# Patient Record
Sex: FEMALE | Race: BLACK OR AFRICAN AMERICAN | NOT HISPANIC OR LATINO | Employment: UNEMPLOYED | RURAL
[De-identification: names, ages, dates, MRNs, and addresses within clinical notes are randomized per-mention and may not be internally consistent; named-entity substitution may affect disease eponyms.]

---
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
## 2024-05-23 ENCOUNTER — HOSPITAL ENCOUNTER (EMERGENCY)
Facility: HOSPITAL | Age: 19
Discharge: HOME | End: 2024-05-23

## 2024-05-23 ENCOUNTER — TELEPHONE (OUTPATIENT)
Dept: EMERGENCY MEDICINE | Facility: HOSPITAL | Age: 19
End: 2024-05-23

## 2024-05-23 VITALS
SYSTOLIC BLOOD PRESSURE: 104 MMHG | HEIGHT: 60 IN | RESPIRATION RATE: 18 BRPM | WEIGHT: 155 LBS | BODY MASS INDEX: 30.43 KG/M2 | DIASTOLIC BLOOD PRESSURE: 68 MMHG | HEART RATE: 81 BPM | OXYGEN SATURATION: 98 % | TEMPERATURE: 99 F

## 2024-05-23 DIAGNOSIS — H10.31 ACUTE BACTERIAL CONJUNCTIVITIS OF RIGHT EYE: Primary | ICD-10-CM

## 2024-05-23 PROCEDURE — 99283 EMERGENCY DEPT VISIT LOW MDM: CPT

## 2024-05-23 PROCEDURE — 25000003 PHARM REV CODE 250: Performed by: NURSE PRACTITIONER

## 2024-05-23 PROCEDURE — 25000242 PHARM REV CODE 250 ALT 637 W/ HCPCS: Performed by: NURSE PRACTITIONER

## 2024-05-23 RX ORDER — ERYTHROMYCIN 5 MG/G
OINTMENT OPHTHALMIC
Status: COMPLETED | OUTPATIENT
Start: 2024-05-23 | End: 2024-05-23

## 2024-05-23 RX ORDER — TETRACAINE HYDROCHLORIDE 5 MG/ML
2 SOLUTION OPHTHALMIC
Status: COMPLETED | OUTPATIENT
Start: 2024-05-23 | End: 2024-05-23

## 2024-05-23 RX ORDER — ERYTHROMYCIN 5 MG/G
OINTMENT OPHTHALMIC
Qty: 3.5 G | Refills: 0 | Status: SHIPPED | OUTPATIENT
Start: 2024-05-23 | End: 2024-05-28 | Stop reason: ALTCHOICE

## 2024-05-23 RX ADMIN — FLUORESCEIN SODIUM 1 EACH: 0.6 STRIP OPHTHALMIC at 03:05

## 2024-05-23 RX ADMIN — ERYTHROMYCIN: 5 OINTMENT OPHTHALMIC at 04:05

## 2024-05-23 RX ADMIN — TETRACAINE HYDROCHLORIDE 2 DROP: 5 SOLUTION OPHTHALMIC at 03:05

## 2024-05-23 NOTE — Clinical Note
"Arnaud "Arnaud" Juvencio was seen and treated in our emergency department on 5/23/2024.  She may return to work on 05/25/2024.       If you have any questions or concerns, please don't hesitate to call.      Amara Merino, FNP"

## 2024-05-23 NOTE — ED PROVIDER NOTES
Encounter Date: 5/23/2024       History     Chief Complaint   Patient presents with    Eye Pain     Patient presents to ER with complaint of right eye pain.  Patient reports symptoms started 4 days ago. She denies injury or accident.  She reports itching and redness.  She denies changes in vision.  Patient denies use of contacts or false eyelashes.      The history is provided by the patient. No  was used.     Review of patient's allergies indicates:  No Known Allergies  Past Medical History:   Diagnosis Date    Exposure to sexually transmitted disease (STD) 07/20/2022    GC, CT, and trich     No past surgical history on file.  No family history on file.  Social History     Tobacco Use    Smoking status: Every Day     Types: Vaping with nicotine    Smokeless tobacco: Never   Substance Use Topics    Alcohol use: Not Currently    Drug use: Yes     Types: Marijuana     Review of Systems   Constitutional:  Positive for activity change and fatigue.   Eyes:  Positive for pain, discharge and redness.   All other systems reviewed and are negative.      Physical Exam     Initial Vitals [05/23/24 1517]   BP Pulse Resp Temp SpO2   104/68 81 18 98.6 °F (37 °C) 98 %      MAP       --         Physical Exam    Nursing note and vitals reviewed.  Constitutional: She appears well-developed and well-nourished.   HENT:   Head: Normocephalic.   Nose: Nose normal.   Mouth/Throat: Oropharynx is clear and moist.   Eyes: Conjunctivae and EOM are normal.   Neck: Neck supple.   Normal range of motion.  Cardiovascular:  Normal rate, normal heart sounds and intact distal pulses.           Pulmonary/Chest: Breath sounds normal.   Abdominal: Abdomen is soft. Bowel sounds are normal.   Musculoskeletal:         General: Normal range of motion.      Cervical back: Normal range of motion and neck supple.     Neurological: She is alert and oriented to person, place, and time. She has normal strength. GCS score is 15. GCS eye  subscore is 4. GCS verbal subscore is 5. GCS motor subscore is 6.   Skin: Skin is warm and dry. Capillary refill takes less than 2 seconds.   Psychiatric: She has a normal mood and affect. Thought content normal.         Medical Screening Exam   See Full Note    ED Course   Procedures  Labs Reviewed - No data to display       Imaging Results    None          Medications   TETRAcaine HCl (PF) 0.5 % Drop 2 drop (2 drops Right Eye Given 24 1539)   fluorescein ophthalmic strip 1 each (1 each Left Eye Given 24 1539)   erythromycin 5 mg/gram (0.5 %) ophthalmic ointment ( Right Eye Given 24 1627)     Medical Decision Making  Patient presents to ER with complaint of right eye pain.  Patient reports symptoms started 4 days ago. She denies injury or accident.  She reports itching and redness.  She denies changes in vision.  Patient denies use of contacts or false eyelashes.        Amount and/or Complexity of Data Reviewed  Discussion of management or test interpretation with external provider(s): Tetracaine and fluorescein used to evaluate right eye with woods lamp. Tolerated well by patient.     Erythromycin ointment to right eye wit soft patch.    Patient is dx home with dx of conjunctivitis.  She is instructed to wear patch x 24 hours and to use ointment 2 x daily x 5 days.  She is instructed to fu with PCP in 2 days for recheck if symptoms do not improve.     Risk  Prescription drug management.                                      Clinical Impression:   Final diagnoses:  [H10.31] Acute bacterial conjunctivitis of right eye (Primary)        ED Disposition Condition    Discharge Stable          ED Prescriptions       Medication Sig Dispense Start Date End Date Auth. Provider    erythromycin (ROMYCIN) ophthalmic ointment () Place a 1/2 inch ribbon of ointment into the right lower eyelid bid x 5 days 3.5 g 2024 Amara Merino, CATP          Follow-up Information    None          Wilber  Amara PLATT, [facility]  06/03/24 6860

## 2024-05-23 NOTE — DISCHARGE INSTRUCTIONS
Wear patch for 24 hours then remove.  Apply erythromycin ointment twice daily to right lower eyelid for 5 days.  Follow up with primary care provider in 2 days if symptoms do not improve with treatment.  Return to the ER with new or worsening symptoms.

## 2024-05-28 ENCOUNTER — HOSPITAL ENCOUNTER (EMERGENCY)
Facility: HOSPITAL | Age: 19
Discharge: HOME | End: 2024-05-28

## 2024-05-28 VITALS
RESPIRATION RATE: 20 BRPM | HEART RATE: 70 BPM | SYSTOLIC BLOOD PRESSURE: 107 MMHG | OXYGEN SATURATION: 99 % | DIASTOLIC BLOOD PRESSURE: 54 MMHG | HEIGHT: 60 IN | TEMPERATURE: 99 F | BODY MASS INDEX: 29.94 KG/M2 | WEIGHT: 152.5 LBS

## 2024-05-28 DIAGNOSIS — B96.89 BACTERIAL CONJUNCTIVITIS OF BOTH EYES: Primary | ICD-10-CM

## 2024-05-28 DIAGNOSIS — H10.9 BACTERIAL CONJUNCTIVITIS OF BOTH EYES: Primary | ICD-10-CM

## 2024-05-28 PROCEDURE — 25000003 PHARM REV CODE 250

## 2024-05-28 PROCEDURE — 99283 EMERGENCY DEPT VISIT LOW MDM: CPT

## 2024-05-28 RX ORDER — CIPROFLOXACIN HYDROCHLORIDE 3 MG/ML
2 SOLUTION/ DROPS OPHTHALMIC 4 TIMES DAILY
Qty: 10 ML | Refills: 0 | Status: SHIPPED | OUTPATIENT
Start: 2024-05-28 | End: 2024-06-04

## 2024-05-28 RX ORDER — CIPROFLOXACIN HYDROCHLORIDE 3 MG/ML
2 SOLUTION/ DROPS OPHTHALMIC 4 TIMES DAILY
Status: DISCONTINUED | OUTPATIENT
Start: 2024-05-29 | End: 2024-05-28

## 2024-05-28 RX ORDER — CIPROFLOXACIN HYDROCHLORIDE 3 MG/ML
2 SOLUTION/ DROPS OPHTHALMIC 4 TIMES DAILY
Status: DISCONTINUED | OUTPATIENT
Start: 2024-05-28 | End: 2024-05-29 | Stop reason: HOSPADM

## 2024-05-28 RX ADMIN — CIPROFLOXACIN HYDROCHLORIDE 2 DROP: 3 SOLUTION/ DROPS OPHTHALMIC at 10:05

## 2024-05-29 NOTE — DISCHARGE INSTRUCTIONS
Discontinue erythromycin  Start Cipro Eye drops four times daily, if no improvement patient will need to see ophthalmology.   If improvement is noted, continue drops for duration of 7 days

## 2024-05-29 NOTE — ED PROVIDER NOTES
"Encounter Date: 5/28/2024       History     Chief Complaint   Patient presents with    Conjunctivitis     C/o both eyes pink and burning, hurt and watering.  States when she wipes the R eye it has blood on the tissue.  States has been rubbing R eye a lot so unsure if she did anything to the eye     Pt is a 19 yo female who presents with cc of "bilateral eye pain and discharge." Pt states that she was seen approximately 1 week ago with conjunctivitis of 1 eye, and given erythromycin ointment at that time. Since then, she states that it has gotten worse and spread. This is thought to be 2/2 improper application of abx. Will treat with cipro, pt instructed that should symptoms worsen or not start getting better within 2 days, she should see eye doc.        Review of patient's allergies indicates:  No Known Allergies  Past Medical History:   Diagnosis Date    Exposure to sexually transmitted disease (STD) 07/20/2022    GC, CT, and trich     History reviewed. No pertinent surgical history.  No family history on file.  Social History     Tobacco Use    Smoking status: Every Day     Types: Vaping with nicotine    Smokeless tobacco: Never   Substance Use Topics    Alcohol use: Not Currently    Drug use: Yes     Types: Marijuana     Review of Systems   Constitutional:  Negative for chills, diaphoresis, fatigue and fever.   HENT:  Negative for congestion, rhinorrhea, sinus pressure, sinus pain and sore throat.    Eyes:  Positive for pain, discharge and redness.   Respiratory:  Negative for cough, chest tightness, shortness of breath and wheezing.    Cardiovascular:  Negative for chest pain, palpitations and leg swelling.   Gastrointestinal:  Negative for abdominal distention, abdominal pain, constipation, diarrhea, nausea and vomiting.   Musculoskeletal:  Negative for arthralgias and myalgias.   Skin:  Negative for rash and wound.   Neurological:  Negative for dizziness, syncope, weakness, light-headedness and headaches. "   All other systems reviewed and are negative.      Physical Exam     Initial Vitals [05/28/24 2033]   BP Pulse Resp Temp SpO2   (!) 107/54 70 20 98.5 °F (36.9 °C) 99 %      MAP       --         Physical Exam    Nursing note and vitals reviewed.  Constitutional: She appears well-developed and well-nourished. No distress.   HENT:   Head: Normocephalic and atraumatic.   Right Ear: External ear normal.   Left Ear: External ear normal.   Nose: Nose normal.   Eyes: EOM are normal. Pupils are equal, round, and reactive to light. Right eye exhibits discharge. Left eye exhibits discharge.   Neck:   Normal range of motion.  Cardiovascular:  Normal rate, regular rhythm and normal heart sounds.     Exam reveals no gallop and no friction rub.       No murmur heard.  Pulmonary/Chest: Breath sounds normal. No respiratory distress. She has no wheezes. She has no rhonchi. She has no rales.   Abdominal: Abdomen is soft. Bowel sounds are normal. She exhibits no distension. There is no abdominal tenderness. There is no rebound and no guarding.   Musculoskeletal:         General: Normal range of motion.      Cervical back: Normal range of motion.     Neurological: She is alert and oriented to person, place, and time.   Skin: Skin is warm and dry.   Psychiatric: She has a normal mood and affect. Thought content normal.         Medical Screening Exam   See Full Note    ED Course   Procedures  Labs Reviewed - No data to display       Imaging Results    None          Medications - No data to display  Medical Decision Making  MDM    Patient presents for emergent evaluation of acute eye redness.  In the ED patient found to have acute bacterial conjunctivitis.        Discharge MDM  I discussed the patient presentation and findings with the consultant for Dr. Pressley (ED).    Patient was managed be managed with Cipro opt x7 days. Pt instructed to take 4 times daily. Pt instructed that should symptoms worsen or fail to improve within 2 days,  she should be seen by ophtho    Patient was discharged in stable condition.  Detailed return precautions discussed.      Risk  Prescription drug management.              Attending Attestation:   Physician Attestation Statement for Resident:  As the supervising MD   I agree with the above history.  -: 18-year-old female patient presented to the emergency department with bilateral pain discharge   As the supervising MD I agree with the above PE.   -: Physical examination revealed bilateral conjunctivitis.   As the supervising MD I agree with the above treatment, course, plan, and disposition.   -: Patient is treated with ciprofloxacin drops and she needs to follow up with the primary care provide                                          Clinical Impression:   Final diagnoses:  [H10.9, B96.89] Bacterial conjunctivitis of both eyes (Primary)        ED Disposition Condition    Discharge Stable          ED Prescriptions       Medication Sig Dispense Start Date End Date Auth. Provider    ciprofloxacin HCl (CILOXAN) 0.3 % ophthalmic solution () Place 2 drops into both eyes 4 (four) times daily. for 7 days 10 mL 2024 John Noguera MD          Follow-up Information    None          Juan Pressley MD  24

## 2024-07-02 ENCOUNTER — HOSPITAL ENCOUNTER (EMERGENCY)
Facility: HOSPITAL | Age: 19
Discharge: HOME | End: 2024-07-02

## 2024-07-02 VITALS
HEIGHT: 60 IN | SYSTOLIC BLOOD PRESSURE: 108 MMHG | OXYGEN SATURATION: 99 % | TEMPERATURE: 98 F | DIASTOLIC BLOOD PRESSURE: 71 MMHG | RESPIRATION RATE: 18 BRPM | HEART RATE: 82 BPM | WEIGHT: 153 LBS | BODY MASS INDEX: 30.04 KG/M2

## 2024-07-02 DIAGNOSIS — N93.9 ABNORMAL VAGINAL BLEEDING: ICD-10-CM

## 2024-07-02 DIAGNOSIS — N30.00 ACUTE CYSTITIS WITHOUT HEMATURIA: Primary | ICD-10-CM

## 2024-07-02 LAB
ALBUMIN SERPL BCP-MCNC: 3.9 G/DL (ref 3.5–5)
ALBUMIN/GLOB SERPL: 0.9 {RATIO}
ALP SERPL-CCNC: 65 U/L (ref 52–144)
ALT SERPL W P-5'-P-CCNC: 17 U/L (ref 13–56)
ANION GAP SERPL CALCULATED.3IONS-SCNC: 9 MMOL/L (ref 7–16)
AST SERPL W P-5'-P-CCNC: 14 U/L (ref 15–37)
B-HCG UR QL: NEGATIVE
BACTERIA #/AREA URNS HPF: ABNORMAL /HPF
BASOPHILS # BLD AUTO: 0.03 K/UL (ref 0–0.2)
BASOPHILS NFR BLD AUTO: 0.4 % (ref 0–1)
BILIRUB SERPL-MCNC: 0.2 MG/DL (ref ?–1.2)
BILIRUB UR QL STRIP: NEGATIVE
BUN SERPL-MCNC: 9 MG/DL (ref 7–18)
BUN/CREAT SERPL: 11 (ref 6–20)
CALCIUM SERPL-MCNC: 9.3 MG/DL (ref 8.5–10.1)
CHLORIDE SERPL-SCNC: 109 MMOL/L (ref 98–107)
CLARITY UR: CLEAR
CO2 SERPL-SCNC: 25 MMOL/L (ref 21–32)
COLOR UR: ABNORMAL
CREAT SERPL-MCNC: 0.79 MG/DL (ref 0.55–1.02)
CTP QC/QA: YES
DIFFERENTIAL METHOD BLD: ABNORMAL
EGFR (NO RACE VARIABLE) (RUSH/TITUS): 111 ML/MIN/1.73M2
EOSINOPHIL # BLD AUTO: 0.22 K/UL (ref 0–0.5)
EOSINOPHIL NFR BLD AUTO: 2.8 % (ref 1–4)
ERYTHROCYTE [DISTWIDTH] IN BLOOD BY AUTOMATED COUNT: 12.3 % (ref 11.5–14.5)
GLOBULIN SER-MCNC: 4.3 G/DL (ref 2–4)
GLUCOSE SERPL-MCNC: 88 MG/DL (ref 74–106)
GLUCOSE UR STRIP-MCNC: NORMAL MG/DL
HCT VFR BLD AUTO: 42.1 % (ref 38–47)
HGB BLD-MCNC: 13.6 G/DL (ref 12–16)
IMM GRANULOCYTES # BLD AUTO: 0.02 K/UL (ref 0–0.04)
IMM GRANULOCYTES NFR BLD: 0.3 % (ref 0–0.4)
KETONES UR STRIP-SCNC: NEGATIVE MG/DL
LEUKOCYTE ESTERASE UR QL STRIP: ABNORMAL
LYMPHOCYTES # BLD AUTO: 3.4 K/UL (ref 1–4.8)
LYMPHOCYTES NFR BLD AUTO: 43.2 % (ref 27–41)
MCH RBC QN AUTO: 28.2 PG (ref 27–31)
MCHC RBC AUTO-ENTMCNC: 32.3 G/DL (ref 32–36)
MCV RBC AUTO: 87.2 FL (ref 80–96)
MONOCYTES # BLD AUTO: 0.51 K/UL (ref 0–0.8)
MONOCYTES NFR BLD AUTO: 6.5 % (ref 2–6)
MPC BLD CALC-MCNC: 9.5 FL (ref 9.4–12.4)
MUCOUS, UA: ABNORMAL /LPF
NEUTROPHILS # BLD AUTO: 3.69 K/UL (ref 1.8–7.7)
NEUTROPHILS NFR BLD AUTO: 46.8 % (ref 53–65)
NITRITE UR QL STRIP: NEGATIVE
NRBC # BLD AUTO: 0 X10E3/UL
NRBC, AUTO (.00): 0 %
PH UR STRIP: 7 PH UNITS
PLATELET # BLD AUTO: 364 K/UL (ref 150–400)
POTASSIUM SERPL-SCNC: 3.7 MMOL/L (ref 3.5–5.1)
PROT SERPL-MCNC: 8.2 G/DL (ref 6.4–8.2)
PROT UR QL STRIP: NEGATIVE
RBC # BLD AUTO: 4.83 M/UL (ref 4.2–5.4)
RBC # UR STRIP: ABNORMAL /UL
RBC #/AREA URNS HPF: 16 /HPF
SODIUM SERPL-SCNC: 139 MMOL/L (ref 136–145)
SP GR UR STRIP: 1.02
SQUAMOUS #/AREA URNS LPF: ABNORMAL /HPF
UROBILINOGEN UR STRIP-ACNC: NORMAL MG/DL
WBC # BLD AUTO: 7.87 K/UL (ref 4.5–11)
WBC #/AREA URNS HPF: 42 /HPF

## 2024-07-02 PROCEDURE — 85025 COMPLETE CBC W/AUTO DIFF WBC: CPT | Performed by: NURSE PRACTITIONER

## 2024-07-02 PROCEDURE — 80053 COMPREHEN METABOLIC PANEL: CPT | Performed by: NURSE PRACTITIONER

## 2024-07-02 PROCEDURE — 36415 COLL VENOUS BLD VENIPUNCTURE: CPT | Performed by: NURSE PRACTITIONER

## 2024-07-02 PROCEDURE — 81003 URINALYSIS AUTO W/O SCOPE: CPT | Performed by: NURSE PRACTITIONER

## 2024-07-02 PROCEDURE — 81001 URINALYSIS AUTO W/SCOPE: CPT | Performed by: NURSE PRACTITIONER

## 2024-07-02 PROCEDURE — 87086 URINE CULTURE/COLONY COUNT: CPT | Performed by: NURSE PRACTITIONER

## 2024-07-02 PROCEDURE — 81025 URINE PREGNANCY TEST: CPT | Performed by: NURSE PRACTITIONER

## 2024-07-02 PROCEDURE — 87077 CULTURE AEROBIC IDENTIFY: CPT | Performed by: NURSE PRACTITIONER

## 2024-07-02 PROCEDURE — 99283 EMERGENCY DEPT VISIT LOW MDM: CPT

## 2024-07-02 RX ORDER — CEPHALEXIN 500 MG/1
500 CAPSULE ORAL EVERY 8 HOURS
Qty: 15 CAPSULE | Refills: 0 | Status: SHIPPED | OUTPATIENT
Start: 2024-07-02 | End: 2024-07-07

## 2024-07-02 NOTE — DISCHARGE INSTRUCTIONS
Take antibiotics as prescribed.    Drink plenty of fluids.    Follow-up with your primary care provider or an OBGYN in a week if you are no better.    Return to the emergency department for new or worsening symptoms.

## 2024-07-02 NOTE — ED PROVIDER NOTES
Encounter Date: 7/2/2024       History     Chief Complaint   Patient presents with    Vaginal Bleeding     Pt presents to the ed via POV w/ c/o vaginal bleeding since last night and bladder for the past couple of days     HPI  Patient is a 18-year-old  female who presents POV to the emergency department with complaint of vaginal bleeding described as spotting since last night.  She states she is not pregnant of which she is aware.  Her last menstrual period was June 14th through June 18th, and she states it was normal.  She does endorse some mild, generalized abdominal cramping, back pain, nausea.  She denies any prior pregnancies.  She also reports urinary urgency with decreased bladder control.  She states when she has the urge to urinate if she has to wait more than 5 minutes she will urinate on herself.    Review of patient's allergies indicates:  No Known Allergies  Past Medical History:   Diagnosis Date    Exposure to sexually transmitted disease (STD) 07/20/2022    GC, CT, and trich     No past surgical history on file.  No family history on file.  Social History     Tobacco Use    Smoking status: Every Day     Types: Vaping with nicotine    Smokeless tobacco: Never   Substance Use Topics    Alcohol use: Not Currently    Drug use: Yes     Types: Marijuana     Review of Systems   Constitutional:  Negative for chills, diaphoresis and fatigue.   Gastrointestinal:  Positive for abdominal pain and nausea. Negative for anal bleeding, blood in stool, constipation, diarrhea, rectal pain and vomiting.   Genitourinary:  Positive for urgency and vaginal bleeding. Negative for decreased urine volume, difficulty urinating, dysuria, enuresis, flank pain, frequency, hematuria, vaginal discharge and vaginal pain.   Musculoskeletal:  Positive for back pain.       Physical Exam     Initial Vitals [07/02/24 1042]   BP Pulse Resp Temp SpO2   108/71 82 18 97.7 °F (36.5 °C) 99 %      MAP       --         Physical  Exam    Constitutional: She appears well-developed and well-nourished.   HENT:   Head: Normocephalic.   Eyes: Pupils are equal, round, and reactive to light.   Pulmonary/Chest: Breath sounds normal. No respiratory distress.   Abdominal: Abdomen is soft. She exhibits no distension. There is no abdominal tenderness. There is no rebound and no guarding.     Neurological: She is alert and oriented to person, place, and time. GCS score is 15. GCS eye subscore is 4. GCS verbal subscore is 5. GCS motor subscore is 6.   Skin: Skin is warm and dry.   Psychiatric: She has a normal mood and affect. Her behavior is normal. Judgment and thought content normal.         Medical Screening Exam   See Full Note    ED Course   Procedures  Labs Reviewed   URINALYSIS, REFLEX TO URINE CULTURE - Abnormal; Notable for the following components:       Result Value    Leukocytes, UA Small (*)     Blood, UA Small (*)     All other components within normal limits   COMPREHENSIVE METABOLIC PANEL - Abnormal; Notable for the following components:    Chloride 109 (*)     Globulin 4.3 (*)     AST 14 (*)     All other components within normal limits   CBC WITH DIFFERENTIAL - Abnormal; Notable for the following components:    Neutrophils % 46.8 (*)     Lymphocytes % 43.2 (*)     Monocytes % 6.5 (*)     All other components within normal limits   URINALYSIS, MICROSCOPIC - Abnormal; Notable for the following components:    WBC, UA 42 (*)     RBC, UA 16 (*)     Bacteria, UA Occasional (*)     Squamous Epithelial Cells, UA Occasional (*)     Mucous Occasional (*)     All other components within normal limits   CULTURE, URINE   CBC W/ AUTO DIFFERENTIAL    Narrative:     The following orders were created for panel order CBC auto differential.  Procedure                               Abnormality         Status                     ---------                               -----------         ------                     CBC with Differential[[id number]]        Abnormal            Final result                 Please view results for these tests on the individual orders.   POCT URINE PREGNANCY          Imaging Results    None          Medications - No data to display  Medical Decision Making  Amount and/or Complexity of Data Reviewed  Labs: ordered.    MDM: 18-year-old female with vaginal bleeding described as spotting, unknown pregnancy status, we will get labs and urine and reassess.                       1240- UPT is negative.  Labs unremarkable.  We will treat for UTI based on UA results plus patient's symptoms.  Offered ultrasound today for her lower abdominal pain and spotting  to rule out ovarian cyst, etc. but patient states she does not want to wait any longer.  Instructed patient to follow-up in clinic within 1 week if no better on the antibiotics.  Patient instructed her pregnancy test could be a false negative due to her last menstrual period just being 2 weeks ago.  Instructed to take another home test if her period is late this month.  Patient voices understanding and is in agreement with the plan of discharge and follow-up.      Clinical Impression:   Final diagnoses:  [N30.00] Acute cystitis without hematuria (Primary)  [N93.9] Abnormal vaginal bleeding        ED Disposition Condition    Discharge Stable          ED Prescriptions       Medication Sig Dispense Start Date End Date Auth. Provider    cephALEXin (KEFLEX) 500 MG capsule Take 1 capsule (500 mg total) by mouth every 8 (eight) hours. for 5 days 15 capsule 7/2/2024 7/7/2024 Christa Quiñones FNP          Follow-up Information    None          Christa Quiñones FNP  07/02/24 3691

## 2024-07-02 NOTE — Clinical Note
"Arnaud Obregon" Juvencio was seen and treated in our emergency department on 7/2/2024.  She may return to work on 07/03/2024.       If you have any questions or concerns, please don't hesitate to call.      Beronica ALVARENGA    "

## 2024-07-02 NOTE — ED TRIAGE NOTES
Chief Complaint   Patient presents with    Vaginal Bleeding     Pt presents to the ed via POV w/ c/o vaginal bleeding since last night and bladder for the past couple of days      
30-Mar-2019 21:18

## 2024-07-04 LAB — UA COMPLETE W REFLEX CULTURE PNL UR: ABNORMAL

## 2024-08-09 ENCOUNTER — HOSPITAL ENCOUNTER (EMERGENCY)
Facility: HOSPITAL | Age: 19
Discharge: HOME | End: 2024-08-09
Payer: COMMERCIAL

## 2024-08-09 VITALS
RESPIRATION RATE: 17 BRPM | WEIGHT: 154 LBS | HEART RATE: 92 BPM | OXYGEN SATURATION: 100 % | BODY MASS INDEX: 30.23 KG/M2 | HEIGHT: 60 IN | TEMPERATURE: 98 F | SYSTOLIC BLOOD PRESSURE: 103 MMHG | DIASTOLIC BLOOD PRESSURE: 65 MMHG

## 2024-08-09 DIAGNOSIS — Z32.01 POSITIVE PREGNANCY TEST: Primary | ICD-10-CM

## 2024-08-09 DIAGNOSIS — M54.9 BACK PAIN, UNSPECIFIED BACK LOCATION, UNSPECIFIED BACK PAIN LATERALITY, UNSPECIFIED CHRONICITY: ICD-10-CM

## 2024-08-09 DIAGNOSIS — R10.9 ABDOMINAL PAIN: ICD-10-CM

## 2024-08-09 LAB
ALBUMIN SERPL BCP-MCNC: 3.5 G/DL (ref 3.5–5)
ALBUMIN/GLOB SERPL: 0.9 {RATIO}
ALP SERPL-CCNC: 59 U/L (ref 52–144)
ALT SERPL W P-5'-P-CCNC: 17 U/L (ref 13–56)
ANION GAP SERPL CALCULATED.3IONS-SCNC: 9 MMOL/L (ref 7–16)
AST SERPL W P-5'-P-CCNC: 11 U/L (ref 15–37)
B-HCG UR QL: POSITIVE
BACTERIA #/AREA URNS HPF: ABNORMAL /HPF
BASOPHILS # BLD AUTO: 0.04 K/UL (ref 0–0.2)
BASOPHILS NFR BLD AUTO: 0.4 % (ref 0–1)
BILIRUB SERPL-MCNC: 0.2 MG/DL (ref ?–1.2)
BILIRUB UR QL STRIP: NEGATIVE
BUN SERPL-MCNC: 7 MG/DL (ref 7–18)
BUN/CREAT SERPL: 9 (ref 6–20)
CALCIUM SERPL-MCNC: 8.8 MG/DL (ref 8.5–10.1)
CHLORIDE SERPL-SCNC: 107 MMOL/L (ref 98–107)
CLARITY UR: CLEAR
CO2 SERPL-SCNC: 27 MMOL/L (ref 21–32)
COLOR UR: YELLOW
CREAT SERPL-MCNC: 0.76 MG/DL (ref 0.55–1.02)
CTP QC/QA: YES
DIFFERENTIAL METHOD BLD: ABNORMAL
EGFR (NO RACE VARIABLE) (RUSH/TITUS): 116 ML/MIN/1.73M2
EOSINOPHIL # BLD AUTO: 0.12 K/UL (ref 0–0.5)
EOSINOPHIL NFR BLD AUTO: 1.2 % (ref 1–4)
ERYTHROCYTE [DISTWIDTH] IN BLOOD BY AUTOMATED COUNT: 12.1 % (ref 11.5–14.5)
GLOBULIN SER-MCNC: 3.7 G/DL (ref 2–4)
GLUCOSE SERPL-MCNC: 92 MG/DL (ref 74–106)
GLUCOSE UR STRIP-MCNC: NORMAL MG/DL
HCG SERPL-ACNC: 725 MIU/ML
HCT VFR BLD AUTO: 40.4 % (ref 38–47)
HGB BLD-MCNC: 13.4 G/DL (ref 12–16)
HYALINE CASTS #/AREA URNS LPF: ABNORMAL /LPF
IMM GRANULOCYTES # BLD AUTO: 0.04 K/UL (ref 0–0.04)
IMM GRANULOCYTES NFR BLD: 0.4 % (ref 0–0.4)
KETONES UR STRIP-SCNC: ABNORMAL MG/DL
LEUKOCYTE ESTERASE UR QL STRIP: NEGATIVE
LIPASE SERPL-CCNC: 29 U/L (ref 16–77)
LYMPHOCYTES # BLD AUTO: 3.35 K/UL (ref 1–4.8)
LYMPHOCYTES NFR BLD AUTO: 33.8 % (ref 27–41)
MCH RBC QN AUTO: 28 PG (ref 27–31)
MCHC RBC AUTO-ENTMCNC: 33.2 G/DL (ref 32–36)
MCV RBC AUTO: 84.3 FL (ref 80–96)
MONOCYTES # BLD AUTO: 0.64 K/UL (ref 0–0.8)
MONOCYTES NFR BLD AUTO: 6.5 % (ref 2–6)
MPC BLD CALC-MCNC: 9.2 FL (ref 9.4–12.4)
MUCOUS, UA: ABNORMAL /LPF
NEUTROPHILS # BLD AUTO: 5.71 K/UL (ref 1.8–7.7)
NEUTROPHILS NFR BLD AUTO: 57.7 % (ref 53–65)
NITRITE UR QL STRIP: NEGATIVE
NRBC # BLD AUTO: 0 X10E3/UL
NRBC, AUTO (.00): 0 %
PH UR STRIP: 6.5 PH UNITS
PLATELET # BLD AUTO: 359 K/UL (ref 150–400)
POTASSIUM SERPL-SCNC: 3.6 MMOL/L (ref 3.5–5.1)
PROT SERPL-MCNC: 7.2 G/DL (ref 6.4–8.2)
PROT UR QL STRIP: 30
RBC # BLD AUTO: 4.79 M/UL (ref 4.2–5.4)
RBC # UR STRIP: NEGATIVE /UL
RBC #/AREA URNS HPF: 2 /HPF
SODIUM SERPL-SCNC: 139 MMOL/L (ref 136–145)
SP GR UR STRIP: 1.03
SQUAMOUS #/AREA URNS LPF: ABNORMAL /HPF
UROBILINOGEN UR STRIP-ACNC: 3 MG/DL
WBC # BLD AUTO: 9.9 K/UL (ref 4.5–11)
WBC #/AREA URNS HPF: 4 /HPF

## 2024-08-09 PROCEDURE — 84702 CHORIONIC GONADOTROPIN TEST: CPT | Performed by: NURSE PRACTITIONER

## 2024-08-09 PROCEDURE — 83690 ASSAY OF LIPASE: CPT | Performed by: NURSE PRACTITIONER

## 2024-08-09 PROCEDURE — 81003 URINALYSIS AUTO W/O SCOPE: CPT | Performed by: NURSE PRACTITIONER

## 2024-08-09 PROCEDURE — 81001 URINALYSIS AUTO W/SCOPE: CPT | Performed by: NURSE PRACTITIONER

## 2024-08-09 PROCEDURE — 85025 COMPLETE CBC W/AUTO DIFF WBC: CPT | Performed by: NURSE PRACTITIONER

## 2024-08-09 PROCEDURE — 80053 COMPREHEN METABOLIC PANEL: CPT | Performed by: NURSE PRACTITIONER

## 2024-08-09 PROCEDURE — 36415 COLL VENOUS BLD VENIPUNCTURE: CPT | Performed by: NURSE PRACTITIONER

## 2024-08-09 PROCEDURE — 81025 URINE PREGNANCY TEST: CPT | Performed by: NURSE PRACTITIONER

## 2024-08-09 PROCEDURE — 99284 EMERGENCY DEPT VISIT MOD MDM: CPT | Mod: 25

## 2024-08-09 RX ORDER — ONDANSETRON 4 MG/1
4 TABLET, FILM COATED ORAL EVERY 6 HOURS
Qty: 12 TABLET | Refills: 0 | Status: SHIPPED | OUTPATIENT
Start: 2024-08-09

## 2024-08-09 RX ORDER — FAMOTIDINE 20 MG
1 TABLET ORAL DAILY
Qty: 30 TABLET | Refills: 0 | Status: SHIPPED | OUTPATIENT
Start: 2024-08-09 | End: 2025-08-09

## 2024-08-09 RX ORDER — ONDANSETRON 4 MG/1
4 TABLET, FILM COATED ORAL EVERY 6 HOURS
Qty: 12 TABLET | Refills: 0 | Status: SHIPPED | OUTPATIENT
Start: 2024-08-09 | End: 2024-08-09

## 2024-08-09 RX ORDER — FAMOTIDINE 20 MG
1 TABLET ORAL DAILY
Qty: 30 TABLET | Refills: 0 | Status: SHIPPED | OUTPATIENT
Start: 2024-08-09 | End: 2024-08-09
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
## 2024-08-09 NOTE — Clinical Note
Todd Benítez accompanied their spouse to the emergency department on 8/9/2024. They may return to work on 08/11/2024.      If you have any questions or concerns, please don't hesitate to call.      Tamiko Abad, FNP

## 2024-08-09 NOTE — Clinical Note
"Kwesiaya "Kwesikev Henning was seen and treated in our emergency department on 8/9/2024.  She may return to work on 08/11/2024.       If you have any questions or concerns, please don't hesitate to call.      Tamiko Abad, FNP"

## 2024-08-09 NOTE — ED NOTES
Pt was seen by other staff members going in and out of the building which has caused a delay in lab work and a delay in her US being completed in a timely manner. This nurse has not seen pt leave but did go in room and inform patient that she had labs ordered and that they would be in there shortly.

## 2024-08-10 NOTE — DISCHARGE INSTRUCTIONS
Follow up with PCP in 48-72 hours. Return to ED if any new or worsening of symptoms occur. Early pregnancy on US;

## 2024-08-14 ENCOUNTER — PROCEDURE VISIT (OUTPATIENT)
Dept: OBSTETRICS AND GYNECOLOGY | Facility: CLINIC | Age: 19
End: 2024-08-14
Payer: COMMERCIAL

## 2024-08-14 ENCOUNTER — OFFICE VISIT (OUTPATIENT)
Dept: OBSTETRICS AND GYNECOLOGY | Facility: CLINIC | Age: 19
End: 2024-08-14
Payer: COMMERCIAL

## 2024-08-14 VITALS
SYSTOLIC BLOOD PRESSURE: 106 MMHG | HEIGHT: 60 IN | TEMPERATURE: 98 F | BODY MASS INDEX: 29.48 KG/M2 | RESPIRATION RATE: 18 BRPM | OXYGEN SATURATION: 99 % | WEIGHT: 150.19 LBS | DIASTOLIC BLOOD PRESSURE: 69 MMHG | HEART RATE: 88 BPM

## 2024-08-14 DIAGNOSIS — R11.0 NAUSEA: ICD-10-CM

## 2024-08-14 DIAGNOSIS — K59.00 CONSTIPATION, UNSPECIFIED CONSTIPATION TYPE: ICD-10-CM

## 2024-08-14 DIAGNOSIS — Z36.89 EVALUATE FETAL POSITION USING ULTRASOUND: Primary | ICD-10-CM

## 2024-08-14 DIAGNOSIS — N91.1 SECONDARY AMENORRHEA: Primary | ICD-10-CM

## 2024-08-14 DIAGNOSIS — F12.91 HISTORY OF MARIJUANA USE: ICD-10-CM

## 2024-08-14 DIAGNOSIS — O99.331 HIGH RISK PREGNANCY DUE TO SMOKING IN FIRST TRIMESTER: ICD-10-CM

## 2024-08-14 PROCEDURE — 3078F DIAST BP <80 MM HG: CPT | Mod: CPTII,,, | Performed by: ADVANCED PRACTICE MIDWIFE

## 2024-08-14 PROCEDURE — 1159F MED LIST DOCD IN RCRD: CPT | Mod: CPTII,,, | Performed by: ADVANCED PRACTICE MIDWIFE

## 2024-08-14 PROCEDURE — 99204 OFFICE O/P NEW MOD 45 MIN: CPT | Mod: ,,, | Performed by: ADVANCED PRACTICE MIDWIFE

## 2024-08-14 PROCEDURE — 3008F BODY MASS INDEX DOCD: CPT | Mod: CPTII,,, | Performed by: ADVANCED PRACTICE MIDWIFE

## 2024-08-14 PROCEDURE — 3074F SYST BP LT 130 MM HG: CPT | Mod: CPTII,,, | Performed by: ADVANCED PRACTICE MIDWIFE

## 2024-08-14 RX ORDER — ONDANSETRON 8 MG/1
8 TABLET, ORALLY DISINTEGRATING ORAL 3 TIMES DAILY
Qty: 30 TABLET | Refills: 2 | Status: SHIPPED | OUTPATIENT
Start: 2024-08-14

## 2024-08-14 RX ORDER — ERGOCALCIFEROL 1.25 MG/1
50000 CAPSULE ORAL
Qty: 5 CAPSULE | Refills: 3 | Status: SHIPPED | OUTPATIENT
Start: 2024-08-14 | End: 2024-09-12

## 2024-08-14 NOTE — PROGRESS NOTES
Missed Menses/ Possible Pregnancy  Patient complains of amenorrhea. She believes she could be pregnant. Pregnancy is desired. Sexual Activity: has sex with males. Current symptoms also include: breast tenderness, fatigue, morning sickness, nausea, and positive home pregnancy test with c/o nausea and vomiting all throughout the day and night. Last period was normal.     Patient's last menstrual period was 2024 (exact date).     Review of patient's allergies indicates:  No Known Allergies    Past Medical History:   Diagnosis Date    Exposure to sexually transmitted disease (STD) 2022    GC, CT, and trich     History reviewed. No pertinent surgical history.  OB History          1    Para   0    Term   0       0    AB   0    Living   0         SAB   0    IAB   0    Ectopic   0    Multiple   0    Live Births   0               No family history on file.  Social History     Tobacco Use    Smoking status: Every Day     Types: Vaping with nicotine     Passive exposure: Never    Smokeless tobacco: Never   Substance Use Topics    Alcohol use: Not Currently    Drug use: Yes     Frequency: 2.0 times per week     Types: Marijuana     Comment: before meal     Genetic History reviewed    Current Outpatient Medications   Medication Sig    ondansetron (ZOFRAN) 4 MG tablet Take 1 tablet (4 mg total) by mouth every 6 (six) hours.    prenatal 21-iron fu-folic acid (PRENATAL COMPLETE) 14 mg iron- 400 mcg Tab Take 1 tablet by mouth once daily.    ergocalciferol (VITAMIN D2) 50,000 unit Cap Take 1 capsule (50,000 Units total) by mouth every 7 days. for 5 doses    ondansetron (ZOFRAN-ODT) 8 MG TbDL Take 1 tablet (8 mg total) by mouth 3 (three) times daily.     No current facility-administered medications for this visit.       OB History    Para Term  AB Living   1 0 0 0 0 0   SAB IAB Ectopic Multiple Live Births   0 0 0 0 0      # Outcome Date GA Lbr Rene/2nd Weight Sex Type Anes PTL Lv   1 Current                  Review of Systems  ROS:  GENERAL: No fever, chills, fatigability or weight loss.  VULVAR: No pain, no lesions and no itching.  VAGINAL: No relaxation, no itching, no discharge, no abnormal bleeding and no lesions.  ABDOMEN: No abdominal pain. Denies nausea. Denies vomiting. No diarrhea. No constipation  BREAST: Denies pain. No lumps. No discharge.  URINARY: No incontinence, no nocturia, no frequency and no dysuria.  CARDIOVASCULAR: No chest pain. No shortness of breath. No leg cramps.  NEUROLOGICAL: no headaches. No vision changes.    Objective:     PE:  AFFECT: Calm, alert and oriented X 3. Interactive during exam  GENERAL: Appears well-nourished, well-developed, in no acute distress.  HEAD: Normocephalic, atruamatic  TEETH: Good dentition.  THYROID: No thyromegally   BREASTS: No masses, skin changes, nipple discharge or adenopathy bilaterally.  SKIN: Normal for race, warm, & dry. No lesions or rashes.  LUNGS: Easy and unlabored, clear to auscultation bilaterally.  HEART: Regular rate and rhythm   EXTREMITIES: No cyanosis, clubbing or edema. No calf tenderness.    Pregnancy test: pregnancy  EDC: 4/8/2025   US today: EDC 4/15/2025 @ 5 weeks 1 day, gestational sac seen within endometrial cavity, no yolk sac or fetal pole seen at this time, no fluid seen in adnexa     Assessment:     Diagnoses and all orders for this visit:    Evaluate fetal position using ultrasound  -     US OB/GYN In Clinic Procedure (Non Viewpoint)- Today; Future    High risk pregnancy due to smoking in first trimester    History of marijuana use    Nausea  -     ondansetron (ZOFRAN-ODT) 8 MG TbDL; Take 1 tablet (8 mg total) by mouth 3 (three) times daily.    Constipation, unspecified constipation type    Other orders  -     ergocalciferol (VITAMIN D2) 50,000 unit Cap; Take 1 capsule (50,000 Units total) by mouth every 7 days. for 5 doses        ICD-10-CM ICD-9-CM    1. Evaluate fetal position using ultrasound  Z36.89 V28.3 US OB/GYN  In Clinic Procedure (Non Viewpoint)- Today      2. High risk pregnancy due to smoking in first trimester  O99.331 649.03       3. History of marijuana use  F12.91 305.23       4. Nausea  R11.0 787.02 ondansetron (ZOFRAN-ODT) 8 MG TbDL      5. Constipation, unspecified constipation type  K59.00 564.00            Plan:     Oriented to practice  Bleeding precautions discussed. If noted, follow up a the emergency room or clinic if scheduled for evaluation.  Counseled to avoid cat litter boxes.  Avoid gardening without gloves  Avoid half cooked, rare, or raw meats.  Avoid foods high in nitrites such as cold cuts- heat up any deli meats.  Seafood no more than 3 times per week or may eat large fish like tuna no more than once a week.  Avoid soft unpasteurized cheeses.  I recommend a PNV daily.    She should avoid ibuprofen, aleve, advil, BC powders.  OB bag with prenatal book and information provided  Weight gain in pregnancy discussed  Vitamin D daily  Increase fiber and water in diet  Stool softners 2 tablets at night prn constipation  Milk of magnesia as needed  Prenatal vitamins daily  Verbalized understanding to all instructions and information  Questions answered to desired level of satisfaction      Follow up in about 3 weeks (around 9/4/2024), or if symptoms worsen or fail to improve, for OB Annual Exam in 3 wks, and f/u for NOB visit in 4 wks with an US.    Mavis Head, PURNIMA, CNM, WHNP-BC

## 2024-08-14 NOTE — PATIENT INSTRUCTIONS
Risks of smoking in pregnancy discussed with patient to include abruption, fetal demise, IUGR (intrauterine growth restriction), small for gestation age,  delivery, sudden infant death syndrome, risks of behavioral issues of offspring of smokers to include ADHD, ADD, and other issues for offspring of smokers to include respiratory issues.  Discussed smoking also increases the risk of poor wound healing.   Marijuana in pregnancy can cross the placenta and can cause a decrease in the birth weight of the baby at birth. Marijuana use in pregnancy has been noted to increase the risk of stillbirth (death of the baby inside of the uterus), increase risk of  birth. Other adverse  outcomes for uses of marijuana in pregnancy can include hypoglycemia, sepsis (infection), anencephaly, esophageal atresia, diaphragmatic hernia, and and increased risk of gastroeshesis. Marijuana use in pregnancy can cause the offspring/child of the marijuana user to have an increased risk of hyperactivity, increased behavioral problems, poor performance on visual perception tasks, language comprehension and sustained attention and memory difficulties, decreased verbal reasoning, and increased impulsivity. Offspring of marijuana users were demonstrated to have scored lower in academic performance in reading and spelling.  Marijuana can increased the risk of nausea and vomiting.

## 2024-08-14 NOTE — LETTER
August 14, 2024    Arnaud Henning  [address]  [address]  [place] [address]             [facility]  [address]  [place] [address]  Phone: [phone]  Fax: [phone] 08/14/2024     Arnaud Henning   2005       Arnaud Henning is currently pregnant and her due date is Estimated Date of Delivery: 4/8/25.    Sincerely,          Vannessa Sotomayor LPN for Mavis Head, DNP, CNM, NP-BC  Doctor of Nursing Practice, Certified Nurse Midwife, Women's Health Nurse Practitioner

## 2024-08-21 ENCOUNTER — HOSPITAL ENCOUNTER (EMERGENCY)
Facility: HOSPITAL | Age: 19
Discharge: HOME | End: 2024-08-21
Payer: COMMERCIAL

## 2024-08-21 VITALS
WEIGHT: 153 LBS | OXYGEN SATURATION: 100 % | HEIGHT: 60 IN | HEART RATE: 81 BPM | TEMPERATURE: 98 F | SYSTOLIC BLOOD PRESSURE: 111 MMHG | BODY MASS INDEX: 30.04 KG/M2 | RESPIRATION RATE: 16 BRPM | DIASTOLIC BLOOD PRESSURE: 68 MMHG

## 2024-08-21 DIAGNOSIS — R10.9 ABDOMINAL PAIN AFFECTING PREGNANCY: ICD-10-CM

## 2024-08-21 DIAGNOSIS — O26.899 ABDOMINAL PAIN AFFECTING PREGNANCY: ICD-10-CM

## 2024-08-21 DIAGNOSIS — O20.8 SUBCHORIONIC HEMORRHAGE OF PLACENTA IN FIRST TRIMESTER: ICD-10-CM

## 2024-08-21 DIAGNOSIS — B34.9 VIRAL SYNDROME: Primary | ICD-10-CM

## 2024-08-21 LAB
ALBUMIN SERPL BCP-MCNC: 3.4 G/DL (ref 3.5–5)
ALBUMIN/GLOB SERPL: 0.8 {RATIO}
ALP SERPL-CCNC: 53 U/L (ref 52–144)
ALT SERPL W P-5'-P-CCNC: 22 U/L (ref 13–56)
ANION GAP SERPL CALCULATED.3IONS-SCNC: 6 MMOL/L (ref 7–16)
AST SERPL W P-5'-P-CCNC: 11 U/L (ref 15–37)
B-HCG UR QL: POSITIVE
BASOPHILS # BLD AUTO: 0.03 K/UL (ref 0–0.2)
BASOPHILS NFR BLD AUTO: 0.4 % (ref 0–1)
BILIRUB SERPL-MCNC: 0.2 MG/DL (ref ?–1.2)
BILIRUB UR QL STRIP: NEGATIVE
BUN SERPL-MCNC: 9 MG/DL (ref 7–18)
BUN/CREAT SERPL: 13 (ref 6–20)
CALCIUM SERPL-MCNC: 9.4 MG/DL (ref 8.5–10.1)
CHLORIDE SERPL-SCNC: 106 MMOL/L (ref 98–107)
CLARITY UR: CLEAR
CO2 SERPL-SCNC: 27 MMOL/L (ref 21–32)
COLOR UR: NORMAL
CREAT SERPL-MCNC: 0.67 MG/DL (ref 0.55–1.02)
CTP QC/QA: YES
DIFFERENTIAL METHOD BLD: ABNORMAL
EGFR (NO RACE VARIABLE) (RUSH/TITUS): 129 ML/MIN/1.73M2
EOSINOPHIL # BLD AUTO: 0.1 K/UL (ref 0–0.5)
EOSINOPHIL NFR BLD AUTO: 1.4 % (ref 1–4)
ERYTHROCYTE [DISTWIDTH] IN BLOOD BY AUTOMATED COUNT: 12.3 % (ref 11.5–14.5)
GLOBULIN SER-MCNC: 4.1 G/DL (ref 2–4)
GLUCOSE SERPL-MCNC: 90 MG/DL (ref 74–106)
GLUCOSE UR STRIP-MCNC: NORMAL MG/DL
HCT VFR BLD AUTO: 37.1 % (ref 38–47)
HGB BLD-MCNC: 12.4 G/DL (ref 12–16)
IMM GRANULOCYTES # BLD AUTO: 0.02 K/UL (ref 0–0.04)
IMM GRANULOCYTES NFR BLD: 0.3 % (ref 0–0.4)
INFLUENZA A MOLECULAR (OHS): NEGATIVE
INFLUENZA B MOLECULAR (OHS): NEGATIVE
KETONES UR STRIP-SCNC: NEGATIVE MG/DL
LEUKOCYTE ESTERASE UR QL STRIP: NEGATIVE
LYMPHOCYTES # BLD AUTO: 2.66 K/UL (ref 1–4.8)
LYMPHOCYTES NFR BLD AUTO: 36.4 % (ref 27–41)
MCH RBC QN AUTO: 28.1 PG (ref 27–31)
MCHC RBC AUTO-ENTMCNC: 33.4 G/DL (ref 32–36)
MCV RBC AUTO: 84.1 FL (ref 80–96)
MONOCYTES # BLD AUTO: 0.9 K/UL (ref 0–0.8)
MONOCYTES NFR BLD AUTO: 12.3 % (ref 2–6)
MPC BLD CALC-MCNC: 9.1 FL (ref 9.4–12.4)
NEUTROPHILS # BLD AUTO: 3.59 K/UL (ref 1.8–7.7)
NEUTROPHILS NFR BLD AUTO: 49.2 % (ref 53–65)
NITRITE UR QL STRIP: NEGATIVE
NRBC # BLD AUTO: 0 X10E3/UL
NRBC, AUTO (.00): 0 %
PH UR STRIP: 6 PH UNITS
PLATELET # BLD AUTO: 317 K/UL (ref 150–400)
POTASSIUM SERPL-SCNC: 4 MMOL/L (ref 3.5–5.1)
PROT SERPL-MCNC: 7.5 G/DL (ref 6.4–8.2)
PROT UR QL STRIP: NEGATIVE
RBC # BLD AUTO: 4.41 M/UL (ref 4.2–5.4)
RBC # UR STRIP: NEGATIVE /UL
SARS-COV-2 RDRP RESP QL NAA+PROBE: NEGATIVE
SODIUM SERPL-SCNC: 135 MMOL/L (ref 136–145)
SP GR UR STRIP: 1.02
UROBILINOGEN UR STRIP-ACNC: NORMAL MG/DL
WBC # BLD AUTO: 7.3 K/UL (ref 4.5–11)

## 2024-08-21 PROCEDURE — 80053 COMPREHEN METABOLIC PANEL: CPT | Performed by: NURSE PRACTITIONER

## 2024-08-21 PROCEDURE — 81025 URINE PREGNANCY TEST: CPT | Performed by: NURSE PRACTITIONER

## 2024-08-21 PROCEDURE — 36415 COLL VENOUS BLD VENIPUNCTURE: CPT | Performed by: NURSE PRACTITIONER

## 2024-08-21 PROCEDURE — 85025 COMPLETE CBC W/AUTO DIFF WBC: CPT | Performed by: NURSE PRACTITIONER

## 2024-08-21 PROCEDURE — 87502 INFLUENZA DNA AMP PROBE: CPT | Performed by: NURSE PRACTITIONER

## 2024-08-21 PROCEDURE — 99284 EMERGENCY DEPT VISIT MOD MDM: CPT | Mod: 25

## 2024-08-21 PROCEDURE — 87635 SARS-COV-2 COVID-19 AMP PRB: CPT | Performed by: NURSE PRACTITIONER

## 2024-08-21 PROCEDURE — 81003 URINALYSIS AUTO W/O SCOPE: CPT | Performed by: NURSE PRACTITIONER

## 2024-08-22 NOTE — ED NOTES
Patient provided with warm blankets and pillow. Instructed that an ultrasound had been ordered and they would be in shortly.

## 2024-08-22 NOTE — ED PROVIDER NOTES
Encounter Date: 8/21/2024       History     Chief Complaint   Patient presents with    Abdominal Pain     7 weeks pregnant BIJAL 4/8/25     18 year old female presents to ED with complaint of abdominal pain and cold symptoms. Patient states this morning she started having pain to upper abdomen. She states as the day progressed, she started having pain to lower abdomen. Denies known fever but reports she has had fever the last few days. Reports nausea and vomiting on today. Denies diarrhea; LBM on yesterday that was soft. Denies dysuria, vaginal bleeding, or discharge. Patient reports congestion that started 3-4 days ago along with cough. She reports chest pain and shortness of breath.     The history is provided by the patient. No  was used.     Review of patient's allergies indicates:  No Known Allergies  Past Medical History:   Diagnosis Date    Exposure to sexually transmitted disease (STD) 07/20/2022    GC, CT, and trich     History reviewed. No pertinent surgical history.  No family history on file.  Social History     Tobacco Use    Smoking status: Every Day     Types: Vaping with nicotine     Passive exposure: Never    Smokeless tobacco: Never   Substance Use Topics    Alcohol use: Not Currently    Drug use: Yes     Frequency: 2.0 times per week     Types: Marijuana     Comment: before meal     Review of Systems   Constitutional:  Positive for fever. Negative for chills.   HENT:  Negative for sinus pressure and sinus pain.    Eyes:  Negative for photophobia and visual disturbance.   Respiratory:  Positive for cough and shortness of breath.    Cardiovascular:  Positive for chest pain. Negative for palpitations.   Gastrointestinal:  Positive for nausea and vomiting.   Genitourinary:  Negative for decreased urine volume, difficulty urinating, vaginal bleeding and vaginal discharge.   Musculoskeletal:  Negative for arthralgias and gait problem.   Skin:  Negative for color change and wound.    Neurological:  Negative for dizziness and weakness.   Hematological:  Negative for adenopathy. Does not bruise/bleed easily.   Psychiatric/Behavioral:  Negative for agitation and confusion.    All other systems reviewed and are negative.      Physical Exam     Initial Vitals [08/21/24 2051]   BP Pulse Resp Temp SpO2   107/68 93 16 98.1 °F (36.7 °C) 100 %      MAP       --         Physical Exam    Nursing note and vitals reviewed.  Constitutional: She appears well-developed and well-nourished.   HENT:   Head: Normocephalic and atraumatic.   Eyes: EOM are normal. Pupils are equal, round, and reactive to light.   Neck: Neck supple.   Normal range of motion.  Cardiovascular:  Normal rate and regular rhythm.           No murmur heard.  Pulmonary/Chest: She has no wheezes. She has no rhonchi.   Abdominal: Abdomen is soft. She exhibits no distension. There is abdominal tenderness in the suprapubic area.   Musculoskeletal:         General: No tenderness or edema.      Cervical back: Normal range of motion and neck supple.     Lymphadenopathy:     She has no cervical adenopathy.   Neurological: She is alert and oriented to person, place, and time. She displays normal reflexes. No cranial nerve deficit or sensory deficit.   Skin: Skin is warm and dry. Capillary refill takes less than 2 seconds.   Psychiatric: She has a normal mood and affect. Thought content normal.         Medical Screening Exam   See Full Note    ED Course   Procedures  Labs Reviewed   COMPREHENSIVE METABOLIC PANEL - Abnormal       Result Value    Sodium 135 (*)     Potassium 4.0      Chloride 106      CO2 27      Anion Gap 6 (*)     Glucose 90      BUN 9      Creatinine 0.67      BUN/Creatinine Ratio 13      Calcium 9.4      Total Protein 7.5      Albumin 3.4 (*)     Globulin 4.1 (*)     A/G Ratio 0.8      Bilirubin, Total 0.2      Alk Phos 53      ALT 22      AST 11 (*)     eGFR 129     CBC WITH DIFFERENTIAL - Abnormal    WBC 7.30      RBC 4.41       Hemoglobin 12.4      Hematocrit 37.1 (*)     MCV 84.1      MCH 28.1      MCHC 33.4      RDW 12.3      Platelet Count 317      MPV 9.1 (*)     Neutrophils % 49.2 (*)     Lymphocytes % 36.4      Monocytes % 12.3 (*)     Eosinophils % 1.4      Basophils % 0.4      Immature Granulocytes % 0.3      nRBC, Auto 0.0      Neutrophils, Abs 3.59      Lymphocytes, Absolute 2.66      Monocytes, Absolute 0.90 (*)     Eosinophils, Absolute 0.10      Basophils, Absolute 0.03      Immature Granulocytes, Absolute 0.02      nRBC, Absolute 0.00      Diff Type Auto     POCT URINE PREGNANCY - Abnormal    POC Preg Test, Ur Positive (*)      Acceptable Yes     INFLUENZA A & B BY MOLECULAR - Normal    INFLUENZA A MOLECULAR Negative      INFLUENZA B MOLECULAR  Negative     SARS-COV-2 RNA AMPLIFICATION, QUAL - Normal    SARS COV-2 Molecular Negative      Narrative:     Negative SARS-CoV results should not be used as the sole basis for treatment or patient management decisions; negative results should be considered in the context of a patient's recent exposures, history and the presene of clinical signs and symptoms consistent with COVID-19.  Negative results should be treated as presumptive and confirmed by molecular assay, if necessary for patient management.   URINALYSIS, REFLEX TO URINE CULTURE    Color, UA Light-Yellow      Clarity, UA Clear      pH, UA 6.0      Leukocytes, UA Negative      Nitrites, UA Negative      Protein, UA Negative      Glucose, UA Normal      Ketones, UA Negative      Urobilinogen, UA Normal      Bilirubin, UA Negative      Blood, UA Negative      Specific Gravity, UA 1.016     CBC W/ AUTO DIFFERENTIAL    Narrative:     The following orders were created for panel order CBC W/ AUTO DIFFERENTIAL.  Procedure                               Abnormality         Status                     ---------                               -----------         ------                     CBC with Differential[[id number]]        Abnormal            Final result                 Please view results for these tests on the individual orders.          Imaging Results    None          Medications - No data to display  Medical Decision Making  18 year old female presents to ED with complaint of abdominal pain and cold symptoms. Patient states this morning she started having pain to upper abdomen. She states as the day progressed, she started having pain to lower abdomen. Denies known fever but reports she has had fever the last few days. Reports nausea and vomiting on today. Denies diarrhea; LBM on yesterday that was soft. Denies dysuria, vaginal bleeding, or discharge. Patient reports congestion that started 3-4 days ago along with cough. She reports chest pain and shortness of breath.     Labs, diagnostics obtained and reviewed.     Amount and/or Complexity of Data Reviewed  Labs: ordered. Decision-making details documented in ED Course.  Radiology: ordered.     Details: Small area of subchorionic hemorrhage; . Imaging c/w LMP                                      Clinical Impression:   Final diagnoses:  [O26.899, R10.9] Abdominal pain affecting pregnancy  [B34.9] Viral syndrome (Primary)  [O20.8] Subchorionic hemorrhage of placenta in first trimester        ED Disposition Condition    Discharge Stable          ED Prescriptions    None       Follow-up Information    None          Tamiko Abad, KAMALA  08/21/24 3831

## 2024-09-04 ENCOUNTER — OFFICE VISIT (OUTPATIENT)
Dept: OBSTETRICS AND GYNECOLOGY | Facility: CLINIC | Age: 19
End: 2024-09-04
Payer: COMMERCIAL

## 2024-09-04 VITALS
TEMPERATURE: 98 F | WEIGHT: 154 LBS | HEART RATE: 97 BPM | SYSTOLIC BLOOD PRESSURE: 112 MMHG | HEIGHT: 60 IN | DIASTOLIC BLOOD PRESSURE: 57 MMHG | RESPIRATION RATE: 18 BRPM | OXYGEN SATURATION: 99 % | BODY MASS INDEX: 30.23 KG/M2

## 2024-09-04 DIAGNOSIS — Z72.51 HIGH RISK HETEROSEXUAL BEHAVIOR: ICD-10-CM

## 2024-09-04 DIAGNOSIS — Z11.3 SCREEN FOR SEXUALLY TRANSMITTED DISEASES: Primary | ICD-10-CM

## 2024-09-04 LAB
CANDIDA SPECIES: NEGATIVE
GARDNERELLA: POSITIVE
TRICHOMONAS: NEGATIVE

## 2024-09-04 PROCEDURE — 3074F SYST BP LT 130 MM HG: CPT | Mod: CPTII,,, | Performed by: ADVANCED PRACTICE MIDWIFE

## 2024-09-04 PROCEDURE — 1159F MED LIST DOCD IN RCRD: CPT | Mod: CPTII,,, | Performed by: ADVANCED PRACTICE MIDWIFE

## 2024-09-04 PROCEDURE — 87660 TRICHOMONAS VAGIN DIR PROBE: CPT | Mod: ,,, | Performed by: CLINICAL MEDICAL LABORATORY

## 2024-09-04 PROCEDURE — 87491 CHLMYD TRACH DNA AMP PROBE: CPT | Mod: ,,, | Performed by: CLINICAL MEDICAL LABORATORY

## 2024-09-04 PROCEDURE — 87510 GARDNER VAG DNA DIR PROBE: CPT | Mod: ,,, | Performed by: CLINICAL MEDICAL LABORATORY

## 2024-09-04 PROCEDURE — 3008F BODY MASS INDEX DOCD: CPT | Mod: CPTII,,, | Performed by: ADVANCED PRACTICE MIDWIFE

## 2024-09-04 PROCEDURE — 99395 PREV VISIT EST AGE 18-39: CPT | Mod: ,,, | Performed by: ADVANCED PRACTICE MIDWIFE

## 2024-09-04 PROCEDURE — 87591 N.GONORRHOEAE DNA AMP PROB: CPT | Mod: ,,, | Performed by: CLINICAL MEDICAL LABORATORY

## 2024-09-04 PROCEDURE — 87480 CANDIDA DNA DIR PROBE: CPT | Mod: ,,, | Performed by: CLINICAL MEDICAL LABORATORY

## 2024-09-04 PROCEDURE — 3078F DIAST BP <80 MM HG: CPT | Mod: CPTII,,, | Performed by: ADVANCED PRACTICE MIDWIFE

## 2024-09-04 NOTE — LETTER
September 4, 2024    Arnaud Henning  [address]  [address]  [place] [address]             [facility] - OB/GYN  Obstetrics and Gynecology  [address] [facility] [place] [address]  Phone: [phone]  Fax: [phone]   September 4, 2024     Patient: Arnaud Henning   YOB: 2005   Date of Visit: 9/4/2024       To Whom it May Concern:    Arnaud Henning was seen in my clinic on 9/4/2024. Todd Benítez brought patient to clinic for appointment and he may, may return to work on 09/05/2024 .    Please excuse her from any classes or work missed.    If you have any questions or concerns, please don't hesitate to call.    Sincerely,         Mavis Head, RADHAM

## 2024-09-04 NOTE — PROGRESS NOTES
CC: Here for pap smear, annual exam    Arnaud Henning is a 18 y.o. female  presents for well woman exam.  LMP: Patient's last menstrual period was 2024 (exact date).. Menses are: normal. Denies any further issues, problems, or complaints. C/o having issues sleeping at night.    Last mammogram: n/a  Colonoscopy: n/a    Past Medical History:   Diagnosis Date    Exposure to sexually transmitted disease (STD) 2022    GC, CT, and trich     No past surgical history on file.  Social History     Socioeconomic History    Marital status: Single   Tobacco Use    Smoking status: Every Day     Types: Vaping with nicotine     Passive exposure: Never    Smokeless tobacco: Never   Substance and Sexual Activity    Alcohol use: Not Currently    Drug use: Yes     Frequency: 2.0 times per week     Types: Marijuana     Comment: before meal    Sexual activity: Yes     Partners: Male     Comment: pt stopped taking depo injection 5 months ago     No family history on file.  OB History          1    Para   0    Term   0       0    AB   0    Living   0         SAB   0    IAB   0    Ectopic   0    Multiple   0    Live Births   0                 BP (!) 112/57   Pulse 97   Temp 98.4 °F (36.9 °C) (Oral)   Resp 18   Ht 5' (1.524 m)   Wt 69.9 kg (154 lb)   LMP 2024 (Exact Date)   SpO2 99%   BMI 30.08 kg/m²       ROS:  GENERAL: Denies weight gain or weight loss. Feeling well overall.   SKIN: Denies rash or lesions.   HEAD: Denies head injury or headache.   NODES: Denies enlarged lymph nodes.   CHEST: Denies chest pain or shortness of breath.   CARDIOVASCULAR: Denies palpitations or left sided chest pain.   ABDOMEN: No abdominal pain, constipation, diarrhea, nausea, vomiting or rectal bleeding.   URINARY: No frequency, dysuria, hematuria, or burning on urination.  REPRODUCTIVE: See HPI.   BREASTS: The patient performs breast self-examination and denies pain, lumps, or nipple discharge.   HEMATOLOGIC: No  easy bruisability or excessive bleeding.   MUSCULOSKELETAL: Denies joint pain or swelling.   NEUROLOGIC: Denies syncope or weakness.   PSYCHIATRIC: Denies depression, anxiety or mood swings.    PHYSICAL EXAM:  APPEARANCE: Well nourished, well developed, in no acute distress.  AFFECT: WNL, alert and oriented x 3  SKIN: No acne or hirsutism  NECK: Neck symmetric without masses or thyromegaly  NODES: No inguinal, cervical, axillary, or femoral lymph node enlargement  CHEST: Good respiratory effect  ABDOMEN: Soft.  No tenderness or masses.  No hepatosplenomegaly.  No hernias.  BREASTS: Symmetrical, no skin changes or visible lesions.  No palpable masses, nipple discharge bilaterally.  PELVIC: Normal external genitalia without lesions.  Normal hair distribution.  Adequate perineal body, normal urethral meatus.  Vagina moist and well rugated without lesions, thin white discharge.  Cervix pink, without lesions, tenderness with thick white discharge.  No significant cystocele or rectocele.  Bimanual exam shows uterus to be normal size, regular, mobile and nontender.  Adnexa without masses or tenderness.    EXTREMITIES: No edema.    Screen for sexually transmitted diseases  -     Chlamydia/GC, PCR; Future; Expected date: 09/04/2024  -     Bacterial Vaginosis; Future; Expected date: 09/04/2024    High risk heterosexual behavior  -     Chlamydia/GC, PCR; Future; Expected date: 09/04/2024  -     Bacterial Vaginosis; Future; Expected date: 09/04/2024        ICD-10-CM ICD-9-CM    1. Screen for sexually transmitted diseases  Z11.3 V74.5 Chlamydia/GC, PCR      Bacterial Vaginosis      2. High risk heterosexual behavior  Z72.51 V69.2 Chlamydia/GC, PCR      Bacterial Vaginosis          Patient was counseled today on A.C.S. Pap guidelines and recommendations for yearly pelvic exams, mammograms and monthly self breast exams; to see her PCP for other health maintenance.   Exercise regimen encouraged  Healthy food choices  encouraged  Multivitamins daily  Vitamin D daily  Melatonin or benadryl prn insomnia  Questions answered to desired level of satisfaction  Verbalized understanding to all information and instructions    Follow up in about 1 year (around 9/4/2025), or if symptoms worsen or fail to improve, for Annual Exam.

## 2024-09-05 DIAGNOSIS — N76.0 BV (BACTERIAL VAGINOSIS): Primary | ICD-10-CM

## 2024-09-05 DIAGNOSIS — B96.89 BV (BACTERIAL VAGINOSIS): Primary | ICD-10-CM

## 2024-09-05 LAB
CHLAMYDIA BY PCR: NEGATIVE
N. GONORRHOEAE (GC) BY PCR: NEGATIVE

## 2024-09-05 RX ORDER — METRONIDAZOLE 500 MG/1
500 TABLET ORAL 2 TIMES DAILY
Qty: 14 TABLET | Refills: 0 | Status: SHIPPED | OUTPATIENT
Start: 2024-09-05 | End: 2024-09-12

## 2024-09-05 NOTE — PROCEDURES
GYN Ultrasound Note:    Uterus 6.88 x 4.1 x 4.21 cm      Right ovary 2.34 x 1.3 x 1.5 cm  Left ovary 2.9 x 2.14 x 2 point cm    Gestational sac 5 weeks 1 day         Impression:  Gestational sac seen within the endometrial cavity with no yolk sac or fetal pole seen at this time   Recommend repeat ultrasound in 1 week

## 2024-09-10 ENCOUNTER — HOSPITAL ENCOUNTER (EMERGENCY)
Facility: HOSPITAL | Age: 19
Discharge: LEFT BEFORE DISCHARGE | End: 2024-09-10
Payer: COMMERCIAL

## 2024-09-10 VITALS
TEMPERATURE: 98 F | BODY MASS INDEX: 29.45 KG/M2 | RESPIRATION RATE: 16 BRPM | WEIGHT: 150 LBS | SYSTOLIC BLOOD PRESSURE: 111 MMHG | HEART RATE: 103 BPM | DIASTOLIC BLOOD PRESSURE: 70 MMHG | HEIGHT: 60 IN | OXYGEN SATURATION: 99 %

## 2024-09-10 DIAGNOSIS — O20.9 VAGINAL BLEEDING AFFECTING EARLY PREGNANCY: Primary | ICD-10-CM

## 2024-09-10 LAB
ALBUMIN SERPL BCP-MCNC: 3.3 G/DL (ref 3.5–5)
ALBUMIN/GLOB SERPL: 0.8 {RATIO}
ALP SERPL-CCNC: 51 U/L (ref 52–144)
ALT SERPL W P-5'-P-CCNC: 23 U/L (ref 13–56)
ANION GAP SERPL CALCULATED.3IONS-SCNC: 10 MMOL/L (ref 7–16)
AST SERPL W P-5'-P-CCNC: 10 U/L (ref 15–37)
BASOPHILS # BLD AUTO: 0.02 K/UL (ref 0–0.2)
BASOPHILS NFR BLD AUTO: 0.2 % (ref 0–1)
BILIRUB SERPL-MCNC: 0.2 MG/DL (ref ?–1.2)
BILIRUB UR QL STRIP: NEGATIVE
BUN SERPL-MCNC: 7 MG/DL (ref 7–18)
BUN/CREAT SERPL: 11 (ref 6–20)
CALCIUM SERPL-MCNC: 9 MG/DL (ref 8.5–10.1)
CHLORIDE SERPL-SCNC: 106 MMOL/L (ref 98–107)
CLARITY UR: ABNORMAL
CO2 SERPL-SCNC: 24 MMOL/L (ref 21–32)
COLOR UR: ABNORMAL
CREAT SERPL-MCNC: 0.65 MG/DL (ref 0.55–1.02)
DIFFERENTIAL METHOD BLD: ABNORMAL
EGFR (NO RACE VARIABLE) (RUSH/TITUS): 130 ML/MIN/1.73M2
EOSINOPHIL # BLD AUTO: 0.07 K/UL (ref 0–0.5)
EOSINOPHIL NFR BLD AUTO: 0.7 % (ref 1–4)
ERYTHROCYTE [DISTWIDTH] IN BLOOD BY AUTOMATED COUNT: 12.6 % (ref 11.5–14.5)
GLOBULIN SER-MCNC: 4.1 G/DL (ref 2–4)
GLUCOSE SERPL-MCNC: 112 MG/DL (ref 74–106)
GLUCOSE UR STRIP-MCNC: NORMAL MG/DL
HCG SERPL-ACNC: NORMAL MIU/ML
HCT VFR BLD AUTO: 36 % (ref 38–47)
HGB BLD-MCNC: 12 G/DL (ref 12–16)
IMM GRANULOCYTES # BLD AUTO: 0.04 K/UL (ref 0–0.04)
IMM GRANULOCYTES NFR BLD: 0.4 % (ref 0–0.4)
KETONES UR STRIP-SCNC: 40 MG/DL
LEUKOCYTE ESTERASE UR QL STRIP: NEGATIVE
LYMPHOCYTES # BLD AUTO: 2.97 K/UL (ref 1–4.8)
LYMPHOCYTES NFR BLD AUTO: 28.4 % (ref 27–41)
MCH RBC QN AUTO: 28 PG (ref 27–31)
MCHC RBC AUTO-ENTMCNC: 33.3 G/DL (ref 32–36)
MCV RBC AUTO: 84.1 FL (ref 80–96)
MONOCYTES # BLD AUTO: 0.5 K/UL (ref 0–0.8)
MONOCYTES NFR BLD AUTO: 4.8 % (ref 2–6)
MPC BLD CALC-MCNC: 9.3 FL (ref 9.4–12.4)
NEUTROPHILS # BLD AUTO: 6.85 K/UL (ref 1.8–7.7)
NEUTROPHILS NFR BLD AUTO: 65.5 % (ref 53–65)
NITRITE UR QL STRIP: NEGATIVE
NRBC # BLD AUTO: 0 X10E3/UL
NRBC, AUTO (.00): 0 %
PH UR STRIP: 6.5 PH UNITS
PLATELET # BLD AUTO: 334 K/UL (ref 150–400)
POTASSIUM SERPL-SCNC: 3.2 MMOL/L (ref 3.5–5.1)
PROT SERPL-MCNC: 7.4 G/DL (ref 6.4–8.2)
PROT UR QL STRIP: NEGATIVE
RBC # BLD AUTO: 4.28 M/UL (ref 4.2–5.4)
RBC # UR STRIP: NEGATIVE /UL
RH BLD: NORMAL
SODIUM SERPL-SCNC: 137 MMOL/L (ref 136–145)
SP GR UR STRIP: 1.01
UROBILINOGEN UR STRIP-ACNC: NORMAL MG/DL
WBC # BLD AUTO: 10.45 K/UL (ref 4.5–11)

## 2024-09-10 PROCEDURE — 85025 COMPLETE CBC W/AUTO DIFF WBC: CPT | Performed by: NURSE PRACTITIONER

## 2024-09-10 PROCEDURE — 80053 COMPREHEN METABOLIC PANEL: CPT | Performed by: NURSE PRACTITIONER

## 2024-09-10 PROCEDURE — 99283 EMERGENCY DEPT VISIT LOW MDM: CPT | Mod: 25

## 2024-09-10 PROCEDURE — 81003 URINALYSIS AUTO W/O SCOPE: CPT | Performed by: NURSE PRACTITIONER

## 2024-09-10 PROCEDURE — 36415 COLL VENOUS BLD VENIPUNCTURE: CPT | Performed by: NURSE PRACTITIONER

## 2024-09-10 PROCEDURE — 86901 BLOOD TYPING SEROLOGIC RH(D): CPT | Performed by: NURSE PRACTITIONER

## 2024-09-10 PROCEDURE — 84702 CHORIONIC GONADOTROPIN TEST: CPT | Performed by: NURSE PRACTITIONER

## 2024-09-10 NOTE — ED TRIAGE NOTES
Pt c/o laceration to left upper side of face. Mother reports the pt was jumping around by the fire place. Bleeding is controlled.

## 2024-09-11 ENCOUNTER — PROCEDURE VISIT (OUTPATIENT)
Dept: OBSTETRICS AND GYNECOLOGY | Facility: CLINIC | Age: 19
End: 2024-09-11
Payer: COMMERCIAL

## 2024-09-11 ENCOUNTER — TELEPHONE (OUTPATIENT)
Dept: EMERGENCY MEDICINE | Facility: HOSPITAL | Age: 19
End: 2024-09-11
Payer: COMMERCIAL

## 2024-09-11 ENCOUNTER — ROUTINE PRENATAL (OUTPATIENT)
Dept: OBSTETRICS AND GYNECOLOGY | Facility: CLINIC | Age: 19
End: 2024-09-11
Payer: COMMERCIAL

## 2024-09-11 VITALS
BODY MASS INDEX: 29.29 KG/M2 | WEIGHT: 150 LBS | HEART RATE: 90 BPM | SYSTOLIC BLOOD PRESSURE: 98 MMHG | DIASTOLIC BLOOD PRESSURE: 66 MMHG

## 2024-09-11 DIAGNOSIS — Z72.51 HIGH RISK HETEROSEXUAL BEHAVIOR: ICD-10-CM

## 2024-09-11 DIAGNOSIS — E55.9 VITAMIN D DEFICIENCY, UNSPECIFIED: ICD-10-CM

## 2024-09-11 DIAGNOSIS — Z36.89 ENCOUNTER FOR OTHER SPECIFIED ANTENATAL SCREENING: ICD-10-CM

## 2024-09-11 DIAGNOSIS — Z3A.10 10 WEEKS GESTATION OF PREGNANCY: ICD-10-CM

## 2024-09-11 DIAGNOSIS — Z34.00 FIRST PREGNANCY IN ADOLESCENT 16 YEARS OF AGE OR OLDER, ANTEPARTUM: Primary | ICD-10-CM

## 2024-09-11 DIAGNOSIS — Z11.3 SCREEN FOR SEXUALLY TRANSMITTED DISEASES: ICD-10-CM

## 2024-09-11 DIAGNOSIS — Z11.4 SCREENING FOR HIV (HUMAN IMMUNODEFICIENCY VIRUS): ICD-10-CM

## 2024-09-11 DIAGNOSIS — O20.8 SUBCHORIONIC HEMORRHAGE OF PLACENTA IN FIRST TRIMESTER: ICD-10-CM

## 2024-09-11 DIAGNOSIS — R63.4 WEIGHT LOSS: ICD-10-CM

## 2024-09-11 DIAGNOSIS — O20.9 FIRST TRIMESTER BLEEDING: ICD-10-CM

## 2024-09-11 DIAGNOSIS — N91.1 SECONDARY AMENORRHEA: Primary | ICD-10-CM

## 2024-09-11 LAB
25(OH)D3 SERPL-MCNC: 42.8 NG/ML
HBV SURFACE AG SERPL QL IA: NORMAL
HIV 1+O+2 AB SERPL QL: NORMAL
RUBV IGG SER-ACNC: NORMAL [IU]/ML
SYPHILIS AB INTERPRETATION: NORMAL

## 2024-09-11 PROCEDURE — 86762 RUBELLA ANTIBODY: CPT | Mod: ,,, | Performed by: CLINICAL MEDICAL LABORATORY

## 2024-09-11 PROCEDURE — 0501F PRENATAL FLOW SHEET: CPT | Mod: ,,, | Performed by: ADVANCED PRACTICE MIDWIFE

## 2024-09-11 PROCEDURE — 87389 HIV-1 AG W/HIV-1&-2 AB AG IA: CPT | Mod: ,,, | Performed by: CLINICAL MEDICAL LABORATORY

## 2024-09-11 PROCEDURE — 86780 TREPONEMA PALLIDUM: CPT | Mod: ,,, | Performed by: CLINICAL MEDICAL LABORATORY

## 2024-09-11 PROCEDURE — 76801 OB US < 14 WKS SINGLE FETUS: CPT | Mod: ,,, | Performed by: OBSTETRICS & GYNECOLOGY

## 2024-09-11 PROCEDURE — 99499 UNLISTED E&M SERVICE: CPT | Mod: ,,, | Performed by: OBSTETRICS & GYNECOLOGY

## 2024-09-11 PROCEDURE — 82306 VITAMIN D 25 HYDROXY: CPT | Mod: ,,, | Performed by: CLINICAL MEDICAL LABORATORY

## 2024-09-11 PROCEDURE — 87340 HEPATITIS B SURFACE AG IA: CPT | Mod: ,,, | Performed by: CLINICAL MEDICAL LABORATORY

## 2024-09-11 PROCEDURE — 36415 COLL VENOUS BLD VENIPUNCTURE: CPT | Mod: ,,, | Performed by: ADVANCED PRACTICE MIDWIFE

## 2024-09-11 NOTE — ED NOTES
Called patient from lobby and no response; called patients cell phone and she states that she stepped out for a moment but was walking back in.

## 2024-09-11 NOTE — PROGRESS NOTES
18 y.o. female  at 10w1d   She c/o bleeding yesterday with passing clots. States used 6 pads in 24 hrs. States has some light bleeding now. Denies any other issues or problems.   Reports no fetal movement or fluttering. Denies any vaginal bleeding, leakage of fluid, cramping, contractions, or pressure.   Total weight gain/weight loss in pregnancy: -1.814 kg (-4 lb)     Vitals  BP: 98/66  Pulse: 90  Weight: 68 kg (150 lb)  Prenatal  Fundal Height (cm): 10 cm  Fetal Heart Rate: 166  Movement: Absent  Edema  LLE Edema: None  RLE Edema: None  Facial: None  Additional Edema?: No  9/10/24 Labs:  BHCG @ ER 61,710  O positive blood type  H/H 12.0/36.0      Prenatal Labs:  Lab Results   Component Value Date    GROUPTRH O POS 09/10/2024    HGB 12.0 09/10/2024    HCT 36.0 (L) 09/10/2024     09/10/2024    HEPBSAG Non-Reactive 2024    SSY20SWNB Non-Reactive 2022    LABNGO Negative 2024    LABURIN >100,000 Escherichia coli (A) 2024       A: 10w1d           ICD-10-CM ICD-9-CM    1. First pregnancy in adolescent 16 years of age or older, antepartum  Z34.00 V22.0 Rubella Antibody Screen      Urine culture      Sickle Cell Screen      Rubella Antibody Screen      Sickle Cell Screen      Urine culture      2. Screen for sexually transmitted diseases  Z11.3 V74.5 Hepatitis B Surface Antigen      Syphilis Antibody with reflex to RPR      Hepatitis B Surface Antigen      Syphilis Antibody with reflex to RPR      3. High risk heterosexual behavior  Z72.51 V69.2 Hepatitis B Surface Antigen      HIV 1/2 Ag/Ab (4th Gen)      Syphilis Antibody with reflex to RPR      Hepatitis B Surface Antigen      HIV 1/2 Ag/Ab (4th Gen)      Syphilis Antibody with reflex to RPR      4. Vitamin D deficiency, unspecified  E55.9 268.9 Vitamin D      Vitamin D      5. Encounter for other specified  screening  Z36.89 V28.9 POCT Urine Drug Screen Presump      Miscellaneous Test, Sendout Tiffany      6.  Screening for HIV (human immunodeficiency virus)  Z11.4 V73.89 HIV 1/2 Ag/Ab (4th Gen)      HIV 1/2 Ag/Ab (4th Gen)      7. 10 weeks gestation of pregnancy  Z3A.10 V22.2 POCT URINALYSIS      hCG, Total, Quantitative      CANCELED: hCG, Total, Quantitative      8. Subchorionic hemorrhage of placenta in first trimester  O20.8 640.83       9. First trimester bleeding  O20.9 640.93 US OB/GYN In Clinic Procedure (Non [facility])-Future      10. Weight loss  R63.4 783.21           P: Bleeding, daily fetal kick counts, and  labor/labor precautions discussed.    The following were addressed during this visit:    1-8 Weeks  - Lifestyle Discussion   - Warning Signs   - Course of Care   - Physiology of Pregnancy   - Nutrition and Supplements   - Domestic Abuse Screen   - HIV Counseling   - Smoking Intervention   - SPAAD/Insurance Verification   - Importance of Exclusive Breastfeeding for First 6 Months   - Continuation of Breastfeeding of Complimentary after intro of solid foods   - Benefits of Breastfeeding     8-12 Weeks  - Review lab tests   - Genetic Counseling (NT/[facility]mino)   - Influenza IM (for due date  - 3/31)   - Non-pharmacologic Pain Relief Methods for Labor & Birth     Discussed subchorionic hemorrhage- monitor for bleeding  Questions answered to desired level of satisfaction  Verbalized understanding to all information and instructions provided.  Follow up in about 4 weeks (around 10/9/2024), or if symptoms worsen or fail to improve, for NICHO Head, DNP, CNM, WHNP-BC              .

## 2024-09-12 NOTE — ED PROVIDER NOTES
Encounter Date: 9/10/2024       History     Chief Complaint   Patient presents with    Vaginal Bleeding     Patient presents to ER with complaint of vaginal bleeding.  Patient reports bleeding started today.She reports she is 10 weeks pregnant.  She states her bleeding is light.  She denies pain or cramping.  She is . LMP 24.  She has not yet established care with ob gyn provider for this pregnancy. Denies fever,  Reports dysuria and frequency.      The history is provided by the patient and a parent. No  was used.     Review of patient's allergies indicates:  No Known Allergies  Past Medical History:   Diagnosis Date    Exposure to sexually transmitted disease (STD) 2022    GC, CT, and trich     History reviewed. No pertinent surgical history.  No family history on file.  Social History     Tobacco Use    Smoking status: Every Day     Types: Vaping with nicotine     Passive exposure: Never    Smokeless tobacco: Never   Substance Use Topics    Alcohol use: Not Currently    Drug use: Yes     Frequency: 2.0 times per week     Types: Marijuana     Comment: before meal     Review of Systems   Constitutional:  Positive for activity change and fatigue.   Genitourinary:  Positive for frequency, urgency and vaginal bleeding.   All other systems reviewed and are negative.      Physical Exam     Initial Vitals [09/10/24 1801]   BP Pulse Resp Temp SpO2   130/75 (!) 130 19 97.7 °F (36.5 °C) 99 %      MAP       --         Physical Exam    Vitals reviewed.  Constitutional: She appears well-developed and well-nourished.   HENT:   Head: Normocephalic.   Nose: Nose normal.   Mouth/Throat: Oropharynx is clear and moist.   Eyes: EOM are normal.   Neck:   Normal range of motion.  Cardiovascular:  Normal rate, normal heart sounds and intact distal pulses.           Pulmonary/Chest: Breath sounds normal.   Abdominal: Bowel sounds are normal.   Musculoskeletal:         General: Normal range of motion.       Cervical back: Normal range of motion.     Neurological: She is alert and oriented to person, place, and time. She has normal strength. GCS score is 15. GCS eye subscore is 4. GCS verbal subscore is 5. GCS motor subscore is 6.   Skin: Skin is warm and dry. Capillary refill takes less than 2 seconds.   Psychiatric: She has a normal mood and affect. Thought content normal.         Medical Screening Exam   See Full Note    ED Course   Procedures  Labs Reviewed   COMPREHENSIVE METABOLIC PANEL - Abnormal       Result Value    Sodium 137      Potassium 3.2 (*)     Chloride 106      CO2 24      Anion Gap 10      Glucose 112 (*)     BUN 7      Creatinine 0.65      BUN/Creatinine Ratio 11      Calcium 9.0      Total Protein 7.4      Albumin 3.3 (*)     Globulin 4.1 (*)     A/G Ratio 0.8      Bilirubin, Total 0.2      Alk Phos 51 (*)     ALT 23      AST 10 (*)     eGFR 130     URINALYSIS, REFLEX TO URINE CULTURE - Abnormal    Color, UA Light-Yellow      Clarity, UA Turbid      pH, UA 6.5      Leukocytes, UA Negative      Nitrites, UA Negative      Protein, UA Negative      Glucose, UA Normal      Ketones, UA 40 (*)     Urobilinogen, UA Normal      Bilirubin, UA Negative      Blood, UA Negative      Specific Gravity, UA 1.009     CBC WITH DIFFERENTIAL - Abnormal    WBC 10.45      RBC 4.28      Hemoglobin 12.0      Hematocrit 36.0 (*)     MCV 84.1      MCH 28.0      MCHC 33.3      RDW 12.6      Platelet Count 334      MPV 9.3 (*)     Neutrophils % 65.5 (*)     Lymphocytes % 28.4      Monocytes % 4.8      Eosinophils % 0.7 (*)     Basophils % 0.2      Immature Granulocytes % 0.4      nRBC, Auto 0.0      Neutrophils, Abs 6.85      Lymphocytes, Absolute 2.97      Monocytes, Absolute 0.50      Eosinophils, Absolute 0.07      Basophils, Absolute 0.02      Immature Granulocytes, Absolute 0.04      nRBC, Absolute 0.00      Diff Type Auto     CBC W/ AUTO DIFFERENTIAL    Narrative:     The following orders were created for panel  order CBC auto differential.  Procedure                               Abnormality         Status                     ---------                               -----------         ------                     CBC with Differential[[id number]]       Abnormal            Final result                 Please view results for these tests on the individual orders.   HCG, TOTAL, QUANTITATIVE    HCG Quantitative 66,710     GROUP & RH    Group & Rh O POS            Imaging Results    None          Medications - No data to display  Medical Decision Making  Patient presents to ER with complaint of vaginal bleeding.  Patient reports bleeding started today.She reports she is 10 weeks pregnant.  She states her bleeding is light.  She denies pain or cramping.  She is . LMP 24.  She has not yet established care with ob gyn provider for this pregnancy. Denies fever,  Reports dysuria and frequency.        Amount and/or Complexity of Data Reviewed  Labs: ordered.  Discussion of management or test interpretation with external provider(s): Patient left prior to treatment being completed.                                       Clinical Impression:   Final diagnoses:  [O20.9] Vaginal bleeding affecting early pregnancy (Primary)        ED Disposition Condition    Amara Mills, [facility]  24 043

## 2024-09-19 NOTE — PROCEDURES
New OB Ultrasound note:      Uterus 9.74 x 5.76.38 cm    Crown-rump length 9 weeks 4 day  Fetal heart rate 166  beats per minute     Impression:    IUP with fetal heart tones   Estimated gestational age 9 weeks 4 day  Estimated delivery date April 12, 2025  Ovary on 3 lateral to gestational sac 1.7 x 1.1 cm

## 2024-11-05 ENCOUNTER — HOSPITAL ENCOUNTER (EMERGENCY)
Facility: HOSPITAL | Age: 19
Discharge: HOME | End: 2024-11-05
Attending: EMERGENCY MEDICINE
Payer: COMMERCIAL

## 2024-11-05 VITALS
HEART RATE: 79 BPM | OXYGEN SATURATION: 100 % | SYSTOLIC BLOOD PRESSURE: 109 MMHG | TEMPERATURE: 98 F | RESPIRATION RATE: 16 BRPM | DIASTOLIC BLOOD PRESSURE: 67 MMHG | WEIGHT: 150 LBS | HEIGHT: 60 IN | BODY MASS INDEX: 29.45 KG/M2

## 2024-11-05 DIAGNOSIS — S39.012A STRAIN OF LUMBAR REGION, INITIAL ENCOUNTER: Primary | ICD-10-CM

## 2024-11-05 PROCEDURE — 25000003 PHARM REV CODE 250: Performed by: EMERGENCY MEDICINE

## 2024-11-05 PROCEDURE — 99283 EMERGENCY DEPT VISIT LOW MDM: CPT

## 2024-11-05 RX ORDER — ACETAMINOPHEN 325 MG/1
650 TABLET ORAL
Status: COMPLETED | OUTPATIENT
Start: 2024-11-05 | End: 2024-11-05

## 2024-11-05 RX ADMIN — ACETAMINOPHEN 650 MG: 325 TABLET ORAL at 08:11
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
## 2024-11-05 NOTE — DISCHARGE INSTRUCTIONS
Use Tylenol as needed    Follow back up with OBGYN    Return if symptoms worsen or new symptoms develop
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
## 2024-11-06 ENCOUNTER — TELEPHONE (OUTPATIENT)
Dept: EMERGENCY MEDICINE | Facility: HOSPITAL | Age: 19
End: 2024-11-06
Payer: COMMERCIAL

## 2024-11-06 ENCOUNTER — PROCEDURE VISIT (OUTPATIENT)
Dept: OBSTETRICS AND GYNECOLOGY | Facility: CLINIC | Age: 19
End: 2024-11-06
Payer: COMMERCIAL

## 2024-11-06 ENCOUNTER — ROUTINE PRENATAL (OUTPATIENT)
Dept: OBSTETRICS AND GYNECOLOGY | Facility: CLINIC | Age: 19
End: 2024-11-06
Payer: COMMERCIAL

## 2024-11-06 VITALS
HEART RATE: 94 BPM | DIASTOLIC BLOOD PRESSURE: 61 MMHG | WEIGHT: 145.38 LBS | BODY MASS INDEX: 28.4 KG/M2 | SYSTOLIC BLOOD PRESSURE: 96 MMHG

## 2024-11-06 DIAGNOSIS — Z3A.18 18 WEEKS GESTATION OF PREGNANCY: Primary | ICD-10-CM

## 2024-11-06 DIAGNOSIS — R11.2 NAUSEA AND VOMITING, UNSPECIFIED VOMITING TYPE: ICD-10-CM

## 2024-11-06 DIAGNOSIS — Z36.89 ENCOUNTER FOR ULTRASOUND TO ASSESS FETAL GROWTH: ICD-10-CM

## 2024-11-06 DIAGNOSIS — Z36.89 ENCOUNTER FOR FETAL ANATOMIC SURVEY: ICD-10-CM

## 2024-11-06 DIAGNOSIS — O26.899 PELVIC CRAMPING IN ANTEPARTUM PERIOD: ICD-10-CM

## 2024-11-06 DIAGNOSIS — R11.0 NAUSEA: ICD-10-CM

## 2024-11-06 DIAGNOSIS — R10.2 PELVIC CRAMPING IN ANTEPARTUM PERIOD: ICD-10-CM

## 2024-11-06 LAB
ALBUMIN SERPL BCP-MCNC: 3.2 G/DL (ref 3.5–5)
ALBUMIN/GLOB SERPL: 0.7 {RATIO}
ALP SERPL-CCNC: 62 U/L (ref 52–144)
ALT SERPL W P-5'-P-CCNC: 62 U/L (ref 13–56)
AMYLASE SERPL-CCNC: 72 U/L (ref 25–115)
ANION GAP SERPL CALCULATED.3IONS-SCNC: 13 MMOL/L (ref 7–16)
AST SERPL W P-5'-P-CCNC: 43 U/L (ref 15–37)
BASOPHILS # BLD AUTO: 0.03 K/UL (ref 0–0.2)
BASOPHILS NFR BLD AUTO: 0.3 % (ref 0–1)
BILIRUB SERPL-MCNC: 0.3 MG/DL (ref ?–1.2)
BILIRUB SERPL-MCNC: NORMAL MG/DL
BLOOD, POC UA: NORMAL
BUN SERPL-MCNC: 8 MG/DL (ref 7–18)
BUN/CREAT SERPL: 18 (ref 6–20)
CALCIUM SERPL-MCNC: 9.4 MG/DL (ref 8.5–10.1)
CHLORIDE SERPL-SCNC: 103 MMOL/L (ref 98–107)
CO2 SERPL-SCNC: 23 MMOL/L (ref 21–32)
CREAT SERPL-MCNC: 0.44 MG/DL (ref 0.55–1.02)
DIFFERENTIAL METHOD BLD: ABNORMAL
EGFR (NO RACE VARIABLE) (RUSH/TITUS): 142 ML/MIN/1.73M2
EOSINOPHIL # BLD AUTO: 0.07 K/UL (ref 0–0.5)
EOSINOPHIL NFR BLD AUTO: 0.7 % (ref 1–4)
ERYTHROCYTE [DISTWIDTH] IN BLOOD BY AUTOMATED COUNT: 12.7 % (ref 11.5–14.5)
GLOBULIN SER-MCNC: 4.5 G/DL (ref 2–4)
GLUCOSE SERPL-MCNC: 61 MG/DL (ref 74–106)
GLUCOSE UR QL STRIP: NEGATIVE
HCT VFR BLD AUTO: 34.6 % (ref 38–47)
HGB BLD-MCNC: 11.4 G/DL (ref 12–16)
IMM GRANULOCYTES # BLD AUTO: 0.04 K/UL (ref 0–0.04)
IMM GRANULOCYTES NFR BLD: 0.4 % (ref 0–0.4)
KETONES UR QL STRIP: 15
LEUKOCYTE ESTERASE URINE, POC: NEGATIVE
LIPASE SERPL-CCNC: 27 U/L (ref 16–77)
LYMPHOCYTES # BLD AUTO: 2.55 K/UL (ref 1–4.8)
LYMPHOCYTES NFR BLD AUTO: 26.9 % (ref 27–41)
MCH RBC QN AUTO: 28.8 PG (ref 27–31)
MCHC RBC AUTO-ENTMCNC: 32.9 G/DL (ref 32–36)
MCV RBC AUTO: 87.4 FL (ref 80–96)
MONOCYTES # BLD AUTO: 0.48 K/UL (ref 0–0.8)
MONOCYTES NFR BLD AUTO: 5.1 % (ref 2–6)
MPC BLD CALC-MCNC: 9.9 FL (ref 9.4–12.4)
NEUTROPHILS # BLD AUTO: 6.32 K/UL (ref 1.8–7.7)
NEUTROPHILS NFR BLD AUTO: 66.6 % (ref 53–65)
NITRITE, POC UA: NEGATIVE
NRBC # BLD AUTO: 0 X10E3/UL
NRBC, AUTO (.00): 0 %
PH, POC UA: 6.5
PLATELET # BLD AUTO: 299 K/UL (ref 150–400)
POTASSIUM SERPL-SCNC: 3.9 MMOL/L (ref 3.5–5.1)
PROT SERPL-MCNC: 7.7 G/DL (ref 6.4–8.2)
PROTEIN, POC: 30
RBC # BLD AUTO: 3.96 M/UL (ref 4.2–5.4)
SODIUM SERPL-SCNC: 135 MMOL/L (ref 136–145)
SPECIFIC GRAVITY, POC UA: 1.02
UROBILINOGEN, POC UA: 1
WBC # BLD AUTO: 9.49 K/UL (ref 4.5–11)

## 2024-11-06 PROCEDURE — 36415 COLL VENOUS BLD VENIPUNCTURE: CPT | Mod: ,,, | Performed by: ADVANCED PRACTICE MIDWIFE

## 2024-11-06 PROCEDURE — 85025 COMPLETE CBC W/AUTO DIFF WBC: CPT | Mod: ,,, | Performed by: CLINICAL MEDICAL LABORATORY

## 2024-11-06 PROCEDURE — 0502F SUBSEQUENT PRENATAL CARE: CPT | Mod: ,,, | Performed by: ADVANCED PRACTICE MIDWIFE

## 2024-11-06 PROCEDURE — 82150 ASSAY OF AMYLASE: CPT | Mod: ,,, | Performed by: CLINICAL MEDICAL LABORATORY

## 2024-11-06 PROCEDURE — 80053 COMPREHEN METABOLIC PANEL: CPT | Mod: ,,, | Performed by: CLINICAL MEDICAL LABORATORY

## 2024-11-06 PROCEDURE — 99499 UNLISTED E&M SERVICE: CPT | Mod: ,,, | Performed by: OBSTETRICS & GYNECOLOGY

## 2024-11-06 PROCEDURE — 83690 ASSAY OF LIPASE: CPT | Mod: ,,, | Performed by: CLINICAL MEDICAL LABORATORY

## 2024-11-06 PROCEDURE — 76805 OB US >/= 14 WKS SNGL FETUS: CPT | Mod: ,,, | Performed by: OBSTETRICS & GYNECOLOGY

## 2024-11-06 RX ORDER — ONDANSETRON 8 MG/1
8 TABLET, ORALLY DISINTEGRATING ORAL 3 TIMES DAILY
Qty: 30 TABLET | Refills: 2 | Status: SHIPPED | OUTPATIENT
Start: 2024-11-06

## 2024-11-06 RX ORDER — FAMOTIDINE 20 MG/1
20 TABLET, FILM COATED ORAL 2 TIMES DAILY
Qty: 60 TABLET | Refills: 1 | Status: SHIPPED | OUTPATIENT
Start: 2024-11-06 | End: 2025-11-06

## 2024-11-06 RX ORDER — ASPIRIN 81 MG/1
81 TABLET ORAL DAILY
COMMUNITY
Start: 2024-11-06 | End: 2025-08-13

## 2024-11-06 RX ORDER — FAMOTIDINE 20 MG
1 TABLET ORAL DAILY
Qty: 30 TABLET | Refills: 11 | Status: SHIPPED | OUTPATIENT
Start: 2024-11-06 | End: 2025-11-06

## 2024-11-06 NOTE — PROGRESS NOTES
19 y.o. female  at 18w1d   She c/o having some cramping off and on.   Reports good fetal movement or fluttering. Denies any vaginal bleeding, leakage of fluid, cramping, contractions, or pressure.   Total weight gain/weight loss in pregnancy: -3.901 kg (-8 lb 9.6 oz)     Vitals  BP: 96/61  Pulse: 94  Weight: 66 kg (145 lb 6.4 oz)  Prenatal  Fundal Height (cm): 18 cm  Fetal Heart Rate: 152  Movement: Present  Urine Albumin/Glucose  Urine Albumin: 1+  Urine Glucose: Negative  Edema  LLE Edema: None  RLE Edema: None  Facial: None  Additional Edema?: No    Prenatal Labs:  Lab Results   Component Value Date    GROUPTRH O POS 09/10/2024    HGB 12.0 09/10/2024    HCT 36.0 (L) 09/10/2024     09/10/2024    SICKLE Negative 2024    HEPBSAG Non-Reactive 2024    [id number] Non-Reactive 2024    LABNGO Negative 2024    LABURIN >100,000 Escherichia coli (A) 2024       A: 18w1d           ICD-10-CM ICD-9-CM    1. 18 weeks gestation of pregnancy  Z3A.18 V22.2 POCT Urinalysis      prenatal 21-iron fu-folic acid (PRENATAL COMPLETE) 14 mg iron- 400 mcg Tab      2. Encounter for fetal anatomic survey  Z36.89 V28.81 US OB 14+ Wks, TransAbd, Single Gestation      3. Encounter for ultrasound to assess fetal growth  Z36.89 V28.3 US OB/GYN In Clinic Procedure (Non Viewpoint)- Today      4. Nausea and vomiting, unspecified vomiting type  R11.2 787.01 Comprehensive Metabolic Panel      CBC Auto Differential      Amylase      Lipase      famotidine (PEPCID) 20 MG tablet      aspirin (ECOTRIN) 81 MG EC tablet      5. Nausea  R11.0 787.02 ondansetron (ZOFRAN-ODT) 8 MG TbDL      6. Pelvic cramping in antepartum period  O26.899 646.83     R10.2 625.9           P: Bleeding, daily fetal kick counts, and  labor/labor precautions discussed.    The following were addressed during this visit:    13-16 Weeks  - Quad screen   - Anatomy Ultrasound   - Breastfeeding Concerns & Resources   - Importance of Early  Skin to Skin Contact     17-20 Weeks  - Quickening   - Lifestyle   - Ultrasound   - Importance of Early and Frequent Breastfeeding   - Baby-led Feeding   - Frequent feeding to help assure optimal milk production       Questions answered to desired level of satisfaction  Verbalized understanding to all information and instructions provided.  Follow up in about 4 weeks (around 12/4/2024), or if symptoms worsen or fail to improve, for TERESA visit.    Mavis Head, PURNIMA, CNM, WHNP-BC

## 2024-11-14 NOTE — PROCEDURES
OB Ultrasound Note:  BPD- 17 week 6 day  HC - 18 weeks 0 day  Abdomina circumference- 18 weeks 1 day  Femur length- 18 weeks 0 day      Placental location-- posterior    Fetal heart rate-15 4  Fetal position- transverse    Impression-    BIJAL April 9, 2025  Estmated gestational age 18 weeks 0 day

## 2024-12-03 ENCOUNTER — OFFICE VISIT (OUTPATIENT)
Dept: DERMATOLOGY | Facility: CLINIC | Age: 19
End: 2024-12-03
Payer: MEDICAID

## 2024-12-03 ENCOUNTER — HOSPITAL ENCOUNTER (OUTPATIENT)
Dept: RADIOLOGY | Facility: HOSPITAL | Age: 19
Discharge: HOME | End: 2024-12-03
Attending: ADVANCED PRACTICE MIDWIFE
Payer: MEDICAID

## 2024-12-03 DIAGNOSIS — Z36.89 ENCOUNTER FOR FETAL ANATOMIC SURVEY: ICD-10-CM

## 2024-12-03 DIAGNOSIS — L20.84 INTRINSIC ECZEMA: Primary | ICD-10-CM

## 2024-12-03 DIAGNOSIS — L70.0 ACNE VULGARIS: ICD-10-CM

## 2024-12-03 PROCEDURE — 99204 OFFICE O/P NEW MOD 45 MIN: CPT | Mod: ,,, | Performed by: DERMATOLOGY

## 2024-12-03 PROCEDURE — 76805 OB US >/= 14 WKS SNGL FETUS: CPT | Mod: TC

## 2024-12-03 PROCEDURE — 76805 OB US >/= 14 WKS SNGL FETUS: CPT | Mod: 26,,, | Performed by: RADIOLOGY

## 2024-12-03 RX ORDER — TRIAMCINOLONE ACETONIDE 0.25 MG/G
CREAM TOPICAL
Qty: 80 G | Refills: 3 | Status: SHIPPED | OUTPATIENT
Start: 2024-12-03

## 2024-12-03 RX ORDER — TRIAMCINOLONE ACETONIDE 1 MG/G
CREAM TOPICAL
Qty: 80 G | Refills: 3 | Status: SHIPPED | OUTPATIENT
Start: 2024-12-03

## 2024-12-03 NOTE — PROGRESS NOTES
[facility]   Maribel Zambrano MD    Patient Name: Arnaud Henning  Patient YOB: 2005   Date of Service: 12/3/24    CC: Acne    HPI: Arnaud Henning is a 19 y.o. female here today for acne, located on the face.  Acne has been present for many years.  Previous treatments include none.  Patient is also concerned today about dry skin on the entire body.    Past Medical History:   Diagnosis Date    Exposure to sexually transmitted disease (STD) 07/20/2022    GC, CT, and trich     No past surgical history on file.  Review of patient's allergies indicates:  No Known Allergies    Current Outpatient Medications:     aspirin (ECOTRIN) 81 MG EC tablet, Take 1 tablet (81 mg total) by mouth once daily., Disp: , Rfl:     famotidine (PEPCID) 20 MG tablet, Take 1 tablet (20 mg total) by mouth 2 (two) times daily., Disp: 60 tablet, Rfl: 1    ondansetron (ZOFRAN) 4 MG tablet, Take 1 tablet (4 mg total) by mouth every 6 (six) hours., Disp: 12 tablet, Rfl: 0    ondansetron (ZOFRAN-ODT) 8 MG TbDL, Take 1 tablet (8 mg total) by mouth 3 (three) times daily., Disp: 30 tablet, Rfl: 2    prenatal 21-iron fu-folic acid (PRENATAL COMPLETE) 14 mg iron- 400 mcg Tab, Take 1 tablet by mouth once daily., Disp: 30 tablet, Rfl: 11    triamcinolone acetonide 0.025% (KENALOG) 0.025 % cream, Apply to affected areas on face twice daily as needed for flares, tapering off with improvement. Do not use for more than 14 days in the same area., Disp: 80 g, Rfl: 3    triamcinolone acetonide 0.1% (KENALOG) 0.1 % cream, Apply to affected areas on body twice daily as needed for flares, tapering off with improvement. Do not use for more than 14 days in the same area., Disp: 80 g, Rfl: 3    ROS: A focused review of systems was obtained and negative.     Exam: A focused skin exam was performed. All areas examined were normal except as mentioned in the assessment and plan below.  General Appearance of the patient is well developed and well  nourished.  Orientation: alert and oriented x 3.  Mood and affect: pleasant.    Assessment:   The primary encounter diagnosis was Intrinsic eczema. A diagnosis of Acne vulgaris was also pertinent to this visit.    Plan:   Medications Ordered This Encounter   Medications    triamcinolone acetonide 0.025% (KENALOG) 0.025 % cream     Sig: Apply to affected areas on face twice daily as needed for flares, tapering off with improvement. Do not use for more than 14 days in the same area.     Dispense:  80 g     Refill:  3    triamcinolone acetonide 0.1% (KENALOG) 0.1 % cream     Sig: Apply to affected areas on body twice daily as needed for flares, tapering off with improvement. Do not use for more than 14 days in the same area.     Dispense:  80 g     Refill:  3       Acne Vulgaris (L70.0)  - inflammatory papules and pustules and comedonal papules  Status: Inadequately controlled      Plan: Counseling.  I counseled the regarding the following:  Skin care: I discussed with the patient the importance of using cleansers, moisturizers and cosmetics that are  non-comedogenic.  Expectations: The patient is aware that it may take up to 2-3 months to see a 60-80% improvement of acne.  Contact office if: Acne worsens or fails to improve despite months of treatment; patient develops new scars,  significantly more nodules or cysts.  I recommended the following over the counter treatments: Cetaphil and CeraVe    -Will defer treatment until pt is postpartum     Eczema   - eczematous papules and plaques on a background of Xerosis    Status: Inadequately controlled      Plan: Counseling  I counseled the patient regarding the following:  Skin care: Patient should bathe using lukewarm water with a mild cleanser and moisturize immediately after. Emollients should be applied at least 2-3 times daily. Avoid scented detergents or fabric softeners. Keep fingernails short. Avoid excessive hand washing.  Expectations: The patient is aware that  eczema is chronic in nature and can improve with moisturizers and topical steroids and worsen with stress, scented soaps, detergents, scratching, dry skin, changes in weather and skin infections.  Contact office if: Eczema worsens or fails to improve despite several weeks of treatment; patient develops skin infections (such as: yellow honey colored crusts or cold sores).    I recommended the following:  Moisturizers    - will send in TAC 0.1% to apply to the body and TAC 0.025% to apply to the face BID PRN flares.  - Instructed to apply thick OTC moisturizing cream daily for prevention.      Follow up in about 6 months (around 6/3/2025) for Acne/Eczema .    Maribel Zambrano MD

## 2024-12-05 ENCOUNTER — ROUTINE PRENATAL (OUTPATIENT)
Dept: OBSTETRICS AND GYNECOLOGY | Facility: CLINIC | Age: 19
End: 2024-12-05
Payer: MEDICAID

## 2024-12-05 VITALS
WEIGHT: 146.81 LBS | SYSTOLIC BLOOD PRESSURE: 104 MMHG | DIASTOLIC BLOOD PRESSURE: 68 MMHG | HEART RATE: 96 BPM | BODY MASS INDEX: 28.67 KG/M2

## 2024-12-05 DIAGNOSIS — Z3A.22 22 WEEKS GESTATION OF PREGNANCY: Primary | ICD-10-CM

## 2024-12-05 DIAGNOSIS — K08.89 TOOTHACHE: ICD-10-CM

## 2024-12-05 DIAGNOSIS — R63.4 WEIGHT LOSS: ICD-10-CM

## 2024-12-05 DIAGNOSIS — D64.9 ANEMIA, UNSPECIFIED TYPE: ICD-10-CM

## 2024-12-05 RX ORDER — PENICILLIN V POTASSIUM 500 MG/1
500 TABLET, FILM COATED ORAL EVERY 8 HOURS
Qty: 30 TABLET | Refills: 0 | Status: SHIPPED | OUTPATIENT
Start: 2024-12-05

## 2024-12-05 RX ORDER — FERROUS SULFATE 325(65) MG
325 TABLET, DELAYED RELEASE (ENTERIC COATED) ORAL 2 TIMES DAILY
Qty: 60 TABLET | Refills: 4 | Status: SHIPPED | OUTPATIENT
Start: 2024-12-05

## 2024-12-05 RX ORDER — TERCONAZOLE 4 MG/G
1 CREAM VAGINAL NIGHTLY
Qty: 7 G | Refills: 1 | Status: SHIPPED | OUTPATIENT
Start: 2024-12-05

## 2024-12-05 NOTE — LETTER
December 5, 2024    Arnaud Henning  [address] [place] [address] Apr [address]  [place] [address]              [facility]  [address] [facility] [address]  Phone: [phone]  Fax: [phone]      To Whom It May Concern:    Ms. Henning is currently under our care for pregnancy.  Estimated Date of Delivery: 04/08/2025    Any elective dental work should preferably be scheduled during or after the mid-trimester or postpartum.    Dental procedures can be performed with local anesthesia without epinephrine. Recommended antibiotics include penicillins, erythromycin, and cephalosporins. Tylenol #3 may be used for pain control. No x-rays are allowed for routine screening. For dental problems, up to four x-rays may be taken with proper abdominal shield.    Sincerely,      Vannessa Sotomayor LPN

## 2024-12-05 NOTE — PROGRESS NOTES
19 y.o. female  at 22w2d   She c/o toothache and is in processing of trying to find a dentist  Reports good fetal movement or fluttering. Denies any vaginal bleeding, leakage of fluid, cramping, contractions, or pressure. States has been sad all the time and crying a lot and don't know why.   Total weight gain/weight loss in pregnancy: -3.266 kg (-7 lb 3.2 oz)     Vitals  BP: 104/68  Pulse: 96  Weight: 66.6 kg (146 lb 12.8 oz)  Prenatal  Fundal Height (cm): 22 cm  Fetal Heart Rate: 141  Movement: Present  Urine Albumin/Glucose  Urine Albumin: 1+  Urine Glucose: Negative  Edema  LLE Edema: None  RLE Edema: None  Facial: None  Additional Edema?: No    Prenatal Labs:  Lab Results   Component Value Date    GROUPTRH O POS 09/10/2024    HGB 11.4 (L) 2024    HCT 34.6 (L) 2024     2024    SICKLE Negative 2024    HEPBSAG Non-Reactive 2024    [id number] Non-Reactive 2024    LABNGO Negative 2024    LABURIN >100,000 Escherichia coli (A) 2024       A: 22w2d           ICD-10-CM ICD-9-CM    1. 22 weeks gestation of pregnancy  Z3A.22 V22.2       2. Toothache  K08.89 525.9 penicillin v potassium (VEETID) 500 MG tablet      terconazole (TERAZOL 7) 0.4 % Crea      3. Anemia, unspecified type  D64.9 285.9 ferrous sulfate 325 (65 FE) MG EC tablet      4. Weight loss  R63.4 783.21           P: Bleeding, daily fetal kick counts, and  labor/labor precautions discussed.    Anemia During Pregnancy  Anemia is a condition in which the red blood cell count is too low. In pregnant women, this is often caused by not having enough iron in the blood. Anemia is common in pregnancy and very easy to treat.  Why You Need Iron  While pregnant, your body uses iron to make red blood cells for you and your fetus. These cells bring oxygen to your fetus and to the rest of your body. Not having enough red blood cells can cause your baby to be born too small. But this is rare, as its easy for  you to get enough iron.  Testing for Anemia  The only way to know whether you have anemia is to have a simple test called a CBC (complete blood count). This is a routine test that will be done at one of your first prenatal visits. This test may be done again, at about weeks 26 to 28.  Treating Anemia  If you have anemia due to low iron content, follow the advice of your healthcare provider. Eating foods high in iron and taking supplements can help you get the iron you need.  Eating Foods High in Iron    Eat foods that are high in iron such as:  Red meat (limit organ meats such as liver)  Seafood (be sure its fully cooked)  Tofu  Green, leafy vegetables  Whole grains and cereals  Dried fruits and nuts  Taking Iron Supplements  In most cases, a prenatal vitamin can keep your iron content high. But if you need more, your doctor may prescribe an iron supplement. Swallow iron pills with a glass of orange or cranberry juice. The vitamin C in these fruit juices can help your body absorb iron. But dont take your iron pills with juices that have calcium added to them. They can keep your body from absorbing the iron.  Iron supplements may have certain side effects. They may cause your stools to turn black, make you feel sick to your stomach or constipated. Here are some tips that may help you limit side effects:  Start slowly. Take one pill a day for a few days. Then work up to your prescribed dose over time.  Take your pills with meals, and avoid them at bedtime.  Increase the fiber in your diet. Eat more whole grains, fruits, and vegetables.  Do mild exercise each day.   © 4739-7833 [facility], [address] [place] [address], [place] [address]. All rights reserved. This information is not intended as a substitute for professional medical care. Always follow your healthcare professional's instructions.    Increase iron rich foods in diet  The following were addressed during this visit:    21-24 Weeks  -  Labor Signs    - Travel During Pregnancy   - Gestational diabetes screening protocol   - Effective Position and Latch   - Risks of Formula Use   - Risks of pacifier use     Questions answered to desired level of satisfaction  Verbalized understanding to all information and instructions provided.  Follow up in about 4 weeks (around 1/2/2025), or if symptoms worsen or fail to improve, for NICHO Head, DNP, CNM, WHNP-BC

## 2024-12-16 ENCOUNTER — HOSPITAL ENCOUNTER (EMERGENCY)
Facility: HOSPITAL | Age: 19
Discharge: HOME | End: 2024-12-16
Attending: OBSTETRICS & GYNECOLOGY
Payer: MEDICAID

## 2024-12-16 VITALS
WEIGHT: 144 LBS | SYSTOLIC BLOOD PRESSURE: 120 MMHG | TEMPERATURE: 98 F | RESPIRATION RATE: 16 BRPM | DIASTOLIC BLOOD PRESSURE: 73 MMHG | HEART RATE: 83 BPM | HEIGHT: 60 IN | BODY MASS INDEX: 28.27 KG/M2

## 2024-12-16 DIAGNOSIS — O99.612 CONSTIPATION DURING PREGNANCY IN SECOND TRIMESTER: Primary | ICD-10-CM

## 2024-12-16 DIAGNOSIS — R11.2 NAUSEA AND VOMITING, UNSPECIFIED VOMITING TYPE: ICD-10-CM

## 2024-12-16 DIAGNOSIS — K59.00 CONSTIPATION DURING PREGNANCY IN SECOND TRIMESTER: Primary | ICD-10-CM

## 2024-12-16 DIAGNOSIS — M54.9 PREGNANCY RELATED BACK PAIN, ANTEPARTUM, SECOND TRIMESTER: ICD-10-CM

## 2024-12-16 DIAGNOSIS — O26.892 PREGNANCY RELATED BACK PAIN, ANTEPARTUM, SECOND TRIMESTER: ICD-10-CM

## 2024-12-16 PROBLEM — M54.50 ACUTE BILATERAL LOW BACK PAIN WITHOUT SCIATICA: Status: ACTIVE | Noted: 2024-12-16

## 2024-12-16 LAB
ALBUMIN SERPL BCP-MCNC: 3.1 G/DL (ref 3.5–5)
ALBUMIN/GLOB SERPL: 0.9 {RATIO}
ALP SERPL-CCNC: 66 U/L (ref 40–150)
ALT SERPL W P-5'-P-CCNC: 23 U/L
AMYLASE SERPL-CCNC: 86 U/L (ref 25–125)
ANION GAP SERPL CALCULATED.3IONS-SCNC: 13 MMOL/L (ref 7–16)
AST SERPL W P-5'-P-CCNC: 23 U/L (ref 5–34)
BASOPHILS # BLD AUTO: 0.03 K/UL (ref 0–0.2)
BASOPHILS NFR BLD AUTO: 0.3 % (ref 0–1)
BILIRUB SERPL-MCNC: 0.3 MG/DL
BILIRUB UR QL STRIP: NEGATIVE
BUN SERPL-MCNC: 4 MG/DL (ref 7–19)
BUN/CREAT SERPL: 8 (ref 6–20)
CALCIUM SERPL-MCNC: 8.6 MG/DL (ref 8.4–10.2)
CHLORIDE SERPL-SCNC: 104 MMOL/L (ref 98–107)
CLARITY UR: CLEAR
CO2 SERPL-SCNC: 20 MMOL/L (ref 22–29)
COLOR UR: YELLOW
CREAT SERPL-MCNC: 0.48 MG/DL (ref 0.55–1.02)
DIFFERENTIAL METHOD BLD: ABNORMAL
EGFR (NO RACE VARIABLE) (RUSH/TITUS): 139 ML/MIN/1.73M2
EOSINOPHIL # BLD AUTO: 0.15 K/UL (ref 0–0.5)
EOSINOPHIL NFR BLD AUTO: 1.3 % (ref 1–4)
ERYTHROCYTE [DISTWIDTH] IN BLOOD BY AUTOMATED COUNT: 13.2 % (ref 11.5–14.5)
GLOBULIN SER-MCNC: 3.4 G/DL (ref 2–4)
GLUCOSE SERPL-MCNC: 83 MG/DL (ref 74–100)
GLUCOSE UR STRIP-MCNC: NORMAL MG/DL
HCT VFR BLD AUTO: 33.4 % (ref 38–47)
HGB BLD-MCNC: 11.1 G/DL (ref 12–16)
IMM GRANULOCYTES # BLD AUTO: 0.06 K/UL (ref 0–0.04)
IMM GRANULOCYTES NFR BLD: 0.5 % (ref 0–0.4)
KETONES UR STRIP-SCNC: NEGATIVE MG/DL
LEUKOCYTE ESTERASE UR QL STRIP: NEGATIVE
LIPASE SERPL-CCNC: 20 U/L
LYMPHOCYTES # BLD AUTO: 2.45 K/UL (ref 1–4.8)
LYMPHOCYTES NFR BLD AUTO: 21.9 % (ref 27–41)
MCH RBC QN AUTO: 29.1 PG (ref 27–31)
MCHC RBC AUTO-ENTMCNC: 33.2 G/DL (ref 32–36)
MCV RBC AUTO: 87.4 FL (ref 80–96)
MONOCYTES # BLD AUTO: 0.54 K/UL (ref 0–0.8)
MONOCYTES NFR BLD AUTO: 4.8 % (ref 2–6)
MPC BLD CALC-MCNC: 10.1 FL (ref 9.4–12.4)
NEUTROPHILS # BLD AUTO: 7.94 K/UL (ref 1.8–7.7)
NEUTROPHILS NFR BLD AUTO: 71.2 % (ref 53–65)
NITRITE UR QL STRIP: NEGATIVE
NRBC # BLD AUTO: 0 X10E3/UL
NRBC, AUTO (.00): 0 %
PH UR STRIP: 8 PH UNITS
PLATELET # BLD AUTO: 261 K/UL (ref 150–400)
POTASSIUM SERPL-SCNC: 3.7 MMOL/L (ref 3.5–5.1)
PROT SERPL-MCNC: 6.5 G/DL (ref 6.4–8.3)
PROT UR QL STRIP: 10
RBC # BLD AUTO: 3.82 M/UL (ref 4.2–5.4)
RBC # UR STRIP: NEGATIVE /UL
SODIUM SERPL-SCNC: 133 MMOL/L (ref 136–145)
SP GR UR STRIP: 1.02
UROBILINOGEN UR STRIP-ACNC: NORMAL MG/DL
WBC # BLD AUTO: 11.17 K/UL (ref 4.5–11)

## 2024-12-16 PROCEDURE — 80053 COMPREHEN METABOLIC PANEL: CPT | Performed by: OBSTETRICS & GYNECOLOGY

## 2024-12-16 PROCEDURE — 81003 URINALYSIS AUTO W/O SCOPE: CPT | Performed by: OBSTETRICS & GYNECOLOGY

## 2024-12-16 PROCEDURE — 82150 ASSAY OF AMYLASE: CPT | Performed by: OBSTETRICS & GYNECOLOGY

## 2024-12-16 PROCEDURE — 99284 EMERGENCY DEPT VISIT MOD MDM: CPT

## 2024-12-16 PROCEDURE — 87591 N.GONORRHOEAE DNA AMP PROB: CPT | Performed by: OBSTETRICS & GYNECOLOGY

## 2024-12-16 PROCEDURE — 83690 ASSAY OF LIPASE: CPT | Performed by: OBSTETRICS & GYNECOLOGY

## 2024-12-16 PROCEDURE — 85025 COMPLETE CBC W/AUTO DIFF WBC: CPT | Performed by: OBSTETRICS & GYNECOLOGY

## 2024-12-16 PROCEDURE — 36415 COLL VENOUS BLD VENIPUNCTURE: CPT | Performed by: OBSTETRICS & GYNECOLOGY

## 2024-12-16 NOTE — Clinical Note
Date: 12/16/2024  Patient: Arnaud Henning  Admitted: 12/16/2024  8:35 AM  Attending Provider: Won Hutchison MD    Arnaud Henning or her authorized caregiver has made the decision for the patient to leave the emergency department against the advice of  her attending physician. She or her authorized caregiver has been informed and understands the inherent risks, including death, incomplete evaluation of her complaints..  She or her authorized caregiver has decided to accept the responsibility for t his decision. Arnaud Henning and all necessary parties have been advised that she may return for further evaluation or treatment. Her condition at time of discharge was stable.  Arnaud Henning had current vital signs as follows:  /73   Pulse 83    Temp 98.4 °F (36.9 °C) (Oral)   Resp 16   Ht 5' (1.524 m)   Wt 65.3 kg (144 lb)   LMP 07/02/2024 (Exact Date)

## 2024-12-16 NOTE — ED NOTES
Dr. Hutchison notified of status. Discharged with discharge instructions given. To keep next scheduled appointments. Voiced understanding.

## 2024-12-16 NOTE — ED NOTES
Refer to CRUZ[facility]O with complaints of cramping, headache, nausea, vomiting, spotting when wipes. Stable condition. Connected to EFM. Plan of care discussed with positive feedback. Oriented to room.

## 2024-12-16 NOTE — ED PROVIDER NOTES
Encounter Date: 2024    [facility] Physician: Won Hutchison   Primary OBGYN:Mavis Head CNM    Admit Diagnosis/Chief Complaint: Abdominal Pain, Back Pain, Nausea and Vomiting, and dysuria  History     Chief Complaint   Patient presents with    Back Pain    Headache    Nausea     Cramping since last night. Spotting when wipes. Burning with urination.     Patient is a 19-year-old  who presents at 23 weeks and 6 days who presents with dysuria suprapubic abdominal discomfort nausea and spotting when wiping following urination.  She also reports midline lower back discomfort which she had reported to her provider ELENA Head CNM  at her last visit on 2024.  She has not purchased the maternity support belt that was recommended.  She reports that her last bowel movement was 2024 and her  abdominal cramping has worsened today.  She has not taken any medication for constipation or nausea.          Obstetric HPI:  Patient reports None contractions, active fetal movement, absent vaginal bleeding , absent loss of fluid      Objective:     Vital Signs (Most Recent):  Temp: 98.4 °F (36.9 °C) (24 0848)  Pulse: 83 (24 0848)  Resp: 16 (24 0848)  BP: 120/73 (24 0848) Vital Signs (24h Range):  Temp:  [98.4 °F (36.9 °C)] 98.4 °F (36.9 °C)  Pulse:  [83] 83  Resp:  [16] 16  BP: (120)/(73) 120/73     Weight: 65.3 kg (144 lb)  Body mass index is 28.12 kg/m².    FHT: 140 Cat 1 (reassuring appropriate for gestational age  TOCO:  No contractions noted  No intake or output data in the 24 hours ending 24 1148    Cervical Exam:per nurse  Dilation:  0  Effacement:  0%  Station: -3  Presentation: Vertex     Significant Labs:  Recent Lab Results         24  0931   24  0909        Albumin/Globulin Ratio 0.9         Albumin 3.1         ALP 66         ALT 23         Amylase Level 86         Anion Gap 13         Appearance, UA   Clear       AST 23         Baso # 0.03         Basophil %  0.3         Bilirubin (UA)   Negative       BILIRUBIN TOTAL 0.3         BUN 4         BUN/CREAT RATIO 8         Calcium 8.6         Chloride 104         CO2 20         Color, UA   Yellow       Creatinine 0.48         Differential Method Auto         eGFR 139         Eos # 0.15         Eos % 1.3         Globulin, Total 3.4         Glucose 83         Glucose, UA   Normal       Hematocrit 33.4         Hemoglobin 11.1         Immature Grans (Abs) 0.06         Immature Granulocytes 0.5         Ketones, UA   Negative       Leukocyte Esterase, UA   Negative       Lipase 20         Lymph # 2.45         Lymph % 21.9         MCH 29.1         MCHC 33.2         MCV 87.4         Mono # 0.54         Mono % 4.8         MPV 10.1         Neutrophils, Abs 7.94         Neutrophils Relative 71.2         NITRITE UA   Negative       nRBC 0.0         NUCLEATED RBC ABSOLUTE 0.00         Blood, UA   Negative       pH, UA   8.0       Platelet Count 261         Potassium 3.7         PROTEIN TOTAL 6.5         Protein, UA   10       RBC 3.82         RDW 13.2         Sodium 133         Spec Grav UA   1.020       UROBILINOGEN UA   Normal       WBC 11.17               Review of patient's allergies indicates:  No Known Allergies  Past Medical History:   Diagnosis Date    Exposure to sexually transmitted disease (STD) 07/20/2022    GC, CT, and trich     No past surgical history on file.  No family history on file.  Social History     Tobacco Use    Smoking status: Every Day     Types: Vaping with nicotine     Passive exposure: Never    Smokeless tobacco: Never   Substance Use Topics    Alcohol use: Not Currently    Drug use: Yes     Frequency: 2.0 times per week     Types: Marijuana     Comment: before meal     Review of Systems   Constitutional:  Negative for chills, fatigue and fever.   HENT:  Negative for congestion.    Eyes:  Negative for visual disturbance.   Respiratory:  Negative for cough.    Gastrointestinal:  Positive for abdominal pain,  constipation, nausea and vomiting. Negative for abdominal distention.   Genitourinary:  Positive for dysuria. Negative for hematuria and urgency.   Musculoskeletal:  Positive for back pain.   Neurological:  Positive for headaches.   Psychiatric/Behavioral:  Negative for confusion.        Physical Exam     Initial Vitals [12/16/24 0848]   BP Pulse Resp Temp SpO2   120/73 83 16 98.4 °F (36.9 °C) --      MAP       --         Physical Exam    Constitutional: She appears well-developed and well-nourished. No distress.   HENT:   Head: Normocephalic and atraumatic.   Cardiovascular:  Normal rate.           Pulmonary/Chest: No respiratory distress.   Abdominal: Abdomen is soft.     Neurological: She is alert.   Psychiatric: She has a normal mood and affect. Thought content normal.         ED Course     Labs Reviewed   URINALYSIS - Abnormal       Result Value    Color, UA Yellow      Clarity, UA Clear      pH, UA 8.0      Leukocytes, UA Negative      Nitrites, UA Negative      Protein, UA 10 (*)     Glucose, UA Normal      Ketones, UA Negative      Urobilinogen, UA Normal      Bilirubin, UA Negative      Blood, UA Negative      Specific Gravity, UA 1.020     COMPREHENSIVE METABOLIC PANEL - Abnormal    Sodium 133 (*)     Potassium 3.7      Chloride 104      CO2 20 (*)     Anion Gap 13      Glucose 83      BUN 4 (*)     Creatinine 0.48 (*)     BUN/Creatinine Ratio 8      Calcium 8.6      Total Protein 6.5      Albumin 3.1 (*)     Globulin 3.4      A/G Ratio 0.9      Bilirubin, Total 0.3      Alk Phos 66      ALT 23      AST 23      eGFR 139     CBC WITH DIFFERENTIAL - Abnormal    WBC 11.17 (*)     RBC 3.82 (*)     Hemoglobin 11.1 (*)     Hematocrit 33.4 (*)     MCV 87.4      MCH 29.1      MCHC 33.2      RDW 13.2      Platelet Count 261      MPV 10.1      Neutrophils % 71.2 (*)     Lymphocytes % 21.9 (*)     Monocytes % 4.8      Eosinophils % 1.3      Basophils % 0.3      Immature Granulocytes % 0.5 (*)     nRBC, Auto 0.0       Neutrophils, Abs 7.94 (*)     Lymphocytes, Absolute 2.45      Monocytes, Absolute 0.54      Eosinophils, Absolute 0.15      Basophils, Absolute 0.03      Immature Granulocytes, Absolute 0.06 (*)     nRBC, Absolute 0.00      Diff Type Auto     LIPASE - Normal    Lipase 20     AMYLASE - Normal    Amylase 86     CHLAMYDIA/GONORRHOEAE(GC), PCR   CBC W/ AUTO DIFFERENTIAL    Narrative:     The following orders were created for panel order CBC auto differential.  Procedure                               Abnormality         Status                     ---------                               -----------         ------                     CBC with Differential[[id number]]       Abnormal            Final result                 Please view results for these tests on the individual orders.   EXTRA TUBES    Narrative:     The following orders were created for panel order EXTRA TUBES.  Procedure                               Abnormality         Status                     ---------                               -----------         ------                     Red Top Hold[[id number]]                                    In process                 Light Green Top Hold[[id number]]                            In process                   Please view results for these tests on the individual orders.   RED TOP HOLD   LIGHT GREEN TOP HOLD        Imaging Results    None          Medical Decision Making     Medications - No data to display              ED Course as of 24 1148   Mon Dec 16, 2024   1034 Patient is a 19-year-old  who presents at 23 weeks and 6 days with complaints of nausea dysuria constipation abdominal cramping lower back pain and spotting when she wipes.  She states she had a headache earlier but did not taking medication for it and is no longer present.  Her lower back pain is described as in the midline.  She has not purchased the recommended maternity support belt yet.  She denies having any nausea at this time.  She  reports active fetal movement fetal heart tracing appears to be appropriate for gestational age.  No uterine contractions are noted.  Cervix is closed thick and high She is afebrile (98.4 ° F) with blood pressure 120/73 and a pulse of 83.  Urinalysis was negative for leukocyte esterase, nitrites, ketones, bilirubin and blood.  10 of protein  and a pH of 8.0 was noted.  WBC 11.17 H&H 11.1 and 33.4 platelet count of 261.  CMP sodium 133 potassium 3.7 chloride 104 CO2 20 glucose 83 BUN 4 creatinine 0.48 bilirubin 0.3 ALT 23 AST 23 amylase 86 lipase 20 GC chlamydia pending.  She will follow-up with her provider in 1 week.  We briefly discussed the use of over-the-counter medication for constipation.  UA was negative for UTI and there was no evidence of  labor. [JA]      ED Course User Index  [JA] Won Hutchison MD                 Clinical Impression:   Final diagnoses:  [R11.2] Nausea and vomiting, unspecified vomiting type  [O99.612, K59.00] Constipation during pregnancy in second trimester (Primary)  [O26.892, M54.9] Pregnancy related back pain, antepartum, second trimester        ED Disposition Condition    Discharge Stable          ED Prescriptions    None       Follow-up Information       Follow up With Specialties Details Why Contact Info    Mavis Head CNM Obstetrics and Gynecology In 1 week  [address] [facility] [address]  [phone]               Won Hutchison MD  [phone]

## 2024-12-16 NOTE — ED NOTES
Discharge instructions reviewed and copy given. Instructed on fetal kick counts and diet and  the importance of fluid hydration. Voiced understanding. Discharge per w/c through front exit. Left in stable condition.

## 2024-12-17 LAB
CHLAMYDIA BY PCR: POSITIVE
N. GONORRHOEAE (GC) BY PCR: NEGATIVE

## 2024-12-17 NOTE — PROGRESS NOTES
Positive chlamydia on 12/16/2024.  Patient left OB ED prior to return of test results.  She will need to have a prescription called in for her from the office.

## 2024-12-20 ENCOUNTER — TELEPHONE (OUTPATIENT)
Dept: OBSTETRICS AND GYNECOLOGY | Facility: CLINIC | Age: 19
End: 2024-12-20
Payer: MEDICAID

## 2024-12-20 DIAGNOSIS — A74.9 CHLAMYDIA: Primary | ICD-10-CM

## 2024-12-20 RX ORDER — AZITHROMYCIN 500 MG/1
1000 TABLET, FILM COATED ORAL ONCE
Qty: 2 TABLET | Refills: 0 | Status: SHIPPED | OUTPATIENT
Start: 2024-12-20 | End: 2024-12-20

## 2024-12-20 NOTE — TELEPHONE ENCOUNTER
Attempted to notify patient regarding lab results she has npt working number.    ----- Message from Mavis Head sent at 12/20/2024 10:43 AM CST -----  Please call patient and notify of lab results and medications sent to pharmacy.  Thank you  ----- Message -----  From: Won Hutchison MD  Sent: 12/17/2024  12:44 AM CST  To: Mavis Head CNM    Positive chlamydia on 12/16/2024.  Patient left OB ED prior to return of test results.  She will need to have a prescription called in for her from the office.

## 2024-12-31 ENCOUNTER — TELEPHONE (OUTPATIENT)
Dept: OBSTETRICS AND GYNECOLOGY | Facility: CLINIC | Age: 19
End: 2024-12-31
Payer: COMMERCIAL

## 2024-12-31 NOTE — TELEPHONE ENCOUNTER
Patient aware of lab results and medication sent to pharmacy on file through.    ----- Message from Mavis Head sent at 12/20/2024 10:43 AM CST -----  Please call patient and notify of lab results and medications sent to pharmacy.  Thank you  ----- Message -----  From: Won Hutchison MD  Sent: 12/17/2024  12:44 AM CST  To: Mavis Head CNM    Positive chlamydia on 12/16/2024.  Patient left OB ED prior to return of test results.  She will need to have a prescription called in for her from the office.

## 2025-01-06 ENCOUNTER — ROUTINE PRENATAL (OUTPATIENT)
Dept: OBSTETRICS AND GYNECOLOGY | Facility: CLINIC | Age: 20
End: 2025-01-06
Payer: COMMERCIAL

## 2025-01-06 VITALS
DIASTOLIC BLOOD PRESSURE: 65 MMHG | SYSTOLIC BLOOD PRESSURE: 105 MMHG | BODY MASS INDEX: 29.33 KG/M2 | WEIGHT: 150.19 LBS | HEART RATE: 96 BPM

## 2025-01-06 DIAGNOSIS — Z3A.26 26 WEEKS GESTATION OF PREGNANCY: Primary | ICD-10-CM

## 2025-01-06 LAB
BILIRUB SERPL-MCNC: NEGATIVE MG/DL
BLOOD, POC UA: NEGATIVE
GLUCOSE UR QL STRIP: NEGATIVE
KETONES UR QL STRIP: NEGATIVE
LEUKOCYTE ESTERASE URINE, POC: NEGATIVE
NITRITE, POC UA: NEGATIVE
PH, POC UA: 7
PROTEIN, POC: NORMAL
SPECIFIC GRAVITY, POC UA: 1.02
UROBILINOGEN, POC UA: NORMAL

## 2025-01-06 PROCEDURE — 0502F SUBSEQUENT PRENATAL CARE: CPT | Mod: ,,, | Performed by: ADVANCED PRACTICE MIDWIFE

## 2025-01-06 NOTE — PROGRESS NOTES
19 y.o. female  at 26w6d   She c/o no problems today  Reports good fetal movement or fluttering. Denies any vaginal bleeding, leakage of fluid, cramping, contractions, or pressure.   Total weight gain/weight loss in pregnancy: -1.724 kg (-3 lb 12.8 oz)     Vitals  BP: 105/65  Pulse: 96  Weight: 68.1 kg (150 lb 3.2 oz)  Prenatal  Fundal Height (cm): 27 cm  Fetal Heart Rate: 154  Movement: Present  Edema  LLE Edema: None  RLE Edema: None  Facial: None  Additional Edema?: No    Prenatal Labs:  Lab Results   Component Value Date    GROUPTRH O POS 09/10/2024    HGB 11.1 (L) 2024    HCT 33.4 (L) 2024     2024    SICKLE Negative 2024    HEPBSAG Non-Reactive 2024    [id number] Non-Reactive 2024    LABNGO Negative 2024    LABURIN >100,000 Escherichia coli (A) 2024       A: 26w6d           ICD-10-CM ICD-9-CM    1. 26 weeks gestation of pregnancy  Z3A.26 V22.2 POCT Urinalysis          P: Bleeding, daily fetal kick counts, and  labor/labor precautions discussed.    The following were addressed during this visit:    25-28 Weeks  -  Labor Signs   - Childbirth Education   - Maternity Leave paperwork   - Smoking Intervention   - Weight Gain/Diet/Exercise   - Rhogam Given   - Rooming in baby during your hospital stay       Questions answered to desired level of satisfaction  Verbalized understanding to all information and instructions provided.  Follow up in about 2 weeks (around 2025), or if symptoms worsen or fail to improve, for TERESA visit, 1 hr gtt.    Mavis Head, PURNIMA, CNM, WHNP-BC

## 2025-01-14 ENCOUNTER — TELEPHONE (OUTPATIENT)
Dept: OBSTETRICS AND GYNECOLOGY | Facility: CLINIC | Age: 20
End: 2025-01-14
Payer: COMMERCIAL

## 2025-01-26 ENCOUNTER — HOSPITAL ENCOUNTER (EMERGENCY)
Facility: HOSPITAL | Age: 20
Discharge: HOME | End: 2025-01-26
Payer: COMMERCIAL

## 2025-01-26 VITALS
DIASTOLIC BLOOD PRESSURE: 56 MMHG | SYSTOLIC BLOOD PRESSURE: 109 MMHG | WEIGHT: 154.81 LBS | BODY MASS INDEX: 30.39 KG/M2 | HEIGHT: 60 IN | TEMPERATURE: 98 F | RESPIRATION RATE: 18 BRPM | HEART RATE: 99 BPM

## 2025-01-26 DIAGNOSIS — O47.9 IRREGULAR UTERINE CONTRACTIONS: Primary | ICD-10-CM

## 2025-01-26 DIAGNOSIS — O47.9 UTERINE CONTRACTIONS DURING PREGNANCY: ICD-10-CM

## 2025-01-26 DIAGNOSIS — O47.1 FALSE LABOR AT OR AFTER 37 COMPLETED WEEKS OF GESTATION, ANTEPARTUM: ICD-10-CM

## 2025-01-26 PROCEDURE — 63600175 PHARM REV CODE 636 W HCPCS: Performed by: SPECIALIST

## 2025-01-26 PROCEDURE — 96360 HYDRATION IV INFUSION INIT: CPT

## 2025-01-26 PROCEDURE — 99284 EMERGENCY DEPT VISIT MOD MDM: CPT | Mod: 25

## 2025-01-26 RX ADMIN — SODIUM CHLORIDE, POTASSIUM CHLORIDE, SODIUM LACTATE AND CALCIUM CHLORIDE 1000 ML: 600; 310; 30; 20 INJECTION, SOLUTION INTRAVENOUS at 05:01

## 2025-01-26 NOTE — ED NOTES
"Patient presents to [facility] at 29.5 weeks with complaints of possible contractions since last night. Reports "tightness" to lower abdomen and lower back q 8-10  min; rates pain 10/10 on pain scale. Denies LOF and vaginal bleeding. Reports positive FM. Denies sexual activity in the last 48 hours. Dr. West is notified of pt arrival and complaint. POC to monitor x 1 hour and reevaluate.     "

## 2025-01-26 NOTE — DISCHARGE INSTRUCTIONS
Please keep all scheduled follow up appointments with your midwife. If you have any further concerns or your pain gets worse, please return to the hospital. We recommend you increase the amount of water you are drinking each day and have attached more information for you.

## 2025-01-26 NOTE — ED PROVIDER NOTES
Encounter Date: 1/26/2025    [facility] Physician: Kel West   Primary OBGYN:Mavis Head CNM    Admit Diagnosis/Chief Complaint: Contractions  History     Chief Complaint   Patient presents with    Contractions     Patient presents with a history of contractions since yesterday.  She denies any vaginal bleeding or leakage of amniotic fluid.        Obstetric HPI:  Patient reports None contractions, active fetal movement, absent vaginal bleeding , absent loss of fluid      Objective:     Vital Signs (Most Recent):  Temp: 97.9 °F (36.6 °C) (01/26/25 1631)  Pulse: 99 (01/26/25 1631)  Resp: 18 (01/26/25 1631)  BP: (!) 109/56 (01/26/25 1631) Vital Signs (24h Range):  Temp:  [97.9 °F (36.6 °C)] 97.9 °F (36.6 °C)  Pulse:  [99] 99  Resp:  [18] 18  BP: (109)/(56) 109/56     Weight: 70.2 kg (154 lb 12.8 oz)  Body mass index is 30.23 kg/m².    FHT: 140Cat 1 (reassuring)  TOCO:  Q  minutes    No intake or output data in the 24 hours ending 01/26/25 1713    Cervical Exam:  Dilation:  0  Effacement:  25%  Station: -3  Presentation: Vertex     Significant Labs:  Recent Lab Results       None          Review of patient's allergies indicates:  No Known Allergies  Past Medical History:   Diagnosis Date    Exposure to sexually transmitted disease (STD) 07/20/2022    GC, CT, and trich     History reviewed. No pertinent surgical history.  No family history on file.  Social History     Tobacco Use    Smoking status: Every Day     Types: Vaping with nicotine, Vaping w/o nicotine     Passive exposure: Never    Smokeless tobacco: Never   Substance Use Topics    Alcohol use: Not Currently    Drug use: Yes     Frequency: 2.0 times per week     Types: Marijuana     Comment: before meal     Review of Systems   Constitutional:  Negative for fever.   HENT:  Negative for sore throat.    Respiratory:  Negative for shortness of breath.    Cardiovascular:  Negative for chest pain.   Gastrointestinal:  Negative for nausea.   Genitourinary:  Negative for  dysuria.   Musculoskeletal:  Negative for back pain.   Skin:  Negative for rash.   Neurological:  Negative for weakness.   Hematological:  Does not bruise/bleed easily.       Physical Exam     Initial Vitals [01/26/25 1631]   BP Pulse Resp Temp SpO2   (!) 109/56 99 18 97.9 °F (36.6 °C) --      MAP       --         Physical Exam    Constitutional: She appears well-developed and well-nourished.   HENT:   Head: Normocephalic and atraumatic.   Eyes: EOM are normal. Pupils are equal, round, and reactive to light.   Neck: Neck supple.   Normal range of motion.  Cardiovascular:  Normal rate and regular rhythm.           Pulmonary/Chest: Breath sounds normal.   Abdominal: Abdomen is soft. Bowel sounds are normal.   Genitourinary:    Vagina and uterus normal.     Musculoskeletal:      Cervical back: Normal range of motion and neck supple.     Neurological: She is alert and oriented to person, place, and time.   Skin: Skin is warm.   Psychiatric: She has a normal mood and affect. Her behavior is normal. Judgment and thought content normal.         ED Course   Labs Reviewed - No data to display     Imaging Results    None          Medical Decision Making     Medications   lactated ringers bolus 1,000 mL (has no administration in time range)                              Clinical Impression:   Final diagnoses:  [O47.9] Irregular uterine contractions (Primary)  [O47.9] Uterine contractions during pregnancy  [O47.1] False labor at or after 37 completed weeks of gestation, antepartum

## 2025-01-27 NOTE — ED NOTES
Dr. West notified that LR bolus is complete. EFM tracing is reviewed with MD. Orders rcvd to discharge home.

## 2025-02-03 ENCOUNTER — ROUTINE PRENATAL (OUTPATIENT)
Dept: OBSTETRICS AND GYNECOLOGY | Facility: CLINIC | Age: 20
End: 2025-02-03
Payer: COMMERCIAL

## 2025-02-03 VITALS
WEIGHT: 153.81 LBS | DIASTOLIC BLOOD PRESSURE: 56 MMHG | HEART RATE: 98 BPM | SYSTOLIC BLOOD PRESSURE: 105 MMHG | BODY MASS INDEX: 30.04 KG/M2

## 2025-02-03 DIAGNOSIS — R63.4 WEIGHT LOSS: ICD-10-CM

## 2025-02-03 DIAGNOSIS — Z3A.30 30 WEEKS GESTATION OF PREGNANCY: Primary | ICD-10-CM

## 2025-02-03 LAB
BILIRUB SERPL-MCNC: NORMAL MG/DL
BLOOD, POC UA: NORMAL
GLUCOSE UR QL STRIP: NORMAL
KETONES UR QL STRIP: NORMAL
LEUKOCYTE ESTERASE URINE, POC: NORMAL
NITRITE, POC UA: NORMAL
PH, POC UA: 7
PROTEIN, POC: 30
SPECIFIC GRAVITY, POC UA: 1.02
UROBILINOGEN, POC UA: 1

## 2025-02-03 PROCEDURE — 0502F SUBSEQUENT PRENATAL CARE: CPT | Mod: ,,, | Performed by: ADVANCED PRACTICE MIDWIFE

## 2025-02-03 NOTE — PROGRESS NOTES
19 y.o. female  at 30w6d   She c/o some cramping off and on with back discomfort- refuses vaginal exam today. States was seen at hospital 2 weeks ago and was told she was dehydrated and to increase fluids  Reports good fetal movement or fluttering. Denies any vaginal bleeding, leakage of fluid, cramping, contractions, or pressure.   Total weight gain/weight loss in pregnancy: -0.091 kg (-3.2 oz)     Vitals  BP: (!) 105/56  Pulse: 98  Weight: 69.8 kg (153 lb 12.8 oz)  Prenatal  Fundal Height (cm): 30 cm  Fetal Heart Rate: 137  Movement: Present  Urine Albumin/Glucose  Urine Albumin: 1+  Urine Glucose: Negative  Edema  LLE Edema: None  RLE Edema: None  Facial: None  Additional Edema?: No    Prenatal Labs:  Lab Results   Component Value Date    GROUPTRH O POS 09/10/2024    HGB 11.1 (L) 2024    HCT 33.4 (L) 2024     2024    SICKLE Negative 2024    HEPBSAG Non-Reactive 2024    [id number] Non-Reactive 2024    LABNGO Negative 2024    LABURIN >100,000 Escherichia coli (A) 2024       A: 30w6d           ICD-10-CM ICD-9-CM    1. 30 weeks gestation of pregnancy  Z3A.30 V22.2 POCT Urinalysis      2. Weight loss  R63.4 783.21           P: Bleeding, daily fetal kick counts, and  labor/labor precautions discussed.    The following were addressed during this visit:    29-32 Weeks  - Tdap Given   - Contraception/Tubal Consent   - Pre-registration   - Circumsision plans   - Op note review/ consent   - Birth Plan   - Pediatrician   - Fetal Kick Counts/PIH/PTL precautions   - Preeclampsia Education   - Quiet time       Questions answered to desired level of satisfaction  Verbalized understanding to all information and instructions provided.  Follow up in about 2 weeks (around 2025), or if symptoms worsen or fail to improve, for TERESA, Ultrasound.    Mavis Head, PURNIMA, CNM, WHNP-BC

## 2025-02-17 ENCOUNTER — PROCEDURE VISIT (OUTPATIENT)
Dept: OBSTETRICS AND GYNECOLOGY | Facility: CLINIC | Age: 20
End: 2025-02-17
Payer: COMMERCIAL

## 2025-02-17 ENCOUNTER — ROUTINE PRENATAL (OUTPATIENT)
Dept: OBSTETRICS AND GYNECOLOGY | Facility: CLINIC | Age: 20
End: 2025-02-17
Payer: COMMERCIAL

## 2025-02-17 VITALS
WEIGHT: 157 LBS | HEART RATE: 88 BPM | SYSTOLIC BLOOD PRESSURE: 100 MMHG | BODY MASS INDEX: 30.66 KG/M2 | DIASTOLIC BLOOD PRESSURE: 51 MMHG

## 2025-02-17 DIAGNOSIS — O36.8130 DECREASED FETAL MOVEMENTS IN THIRD TRIMESTER, SINGLE OR UNSPECIFIED FETUS: ICD-10-CM

## 2025-02-17 DIAGNOSIS — Z3A.32 32 WEEKS GESTATION OF PREGNANCY: Primary | ICD-10-CM

## 2025-02-17 LAB
BILIRUB SERPL-MCNC: NORMAL MG/DL
BLOOD, POC UA: NORMAL
GLUCOSE UR QL STRIP: NORMAL
KETONES UR QL STRIP: NORMAL
LEUKOCYTE ESTERASE URINE, POC: NORMAL
NITRITE, POC UA: NORMAL
PH, POC UA: 6
PROTEIN, POC: NORMAL
SPECIFIC GRAVITY, POC UA: >=1.03
UROBILINOGEN, POC UA: 0.2

## 2025-02-17 NOTE — PROGRESS NOTES
19 y.o. female  at 32w6d   She c/o no problems today  Reports good fetal movement or fluttering. Denies any vaginal bleeding, leakage of fluid, cramping, contractions, or pressure.   Total weight gain/weight loss in pregnancy: 1.361 kg (3 lb)     Vitals  BP: (!) 100/51  Pulse: 88  Weight: 71.2 kg (157 lb)  Prenatal  Fundal Height (cm): 321 cm  Fetal Heart Rate: 141  Movement: Present  Urine Albumin/Glucose  Urine Albumin: Negative  Urine Glucose: Negative  Edema  LLE Edema: None  RLE Edema: None  Facial: None  Additional Edema?: No  Cervical Exam  Presentation: Vertex    Prenatal Labs:  Lab Results   Component Value Date    GROUPTRH O POS 09/10/2024    HGB 11.1 (L) 2024    HCT 33.4 (L) 2024     2024    SICKLE Negative 2024    HEPBSAG Non-Reactive 2024    [id number] Non-Reactive 2024    LABNGO Negative 2024    LABURIN >100,000 Escherichia coli (A) 2024       A: 32w6d           ICD-10-CM ICD-9-CM    1. 32 weeks gestation of pregnancy  Z3A.32 V22.2 POCT Urinalysis          P: Bleeding, daily fetal kick counts, and  labor/labor precautions discussed.    No pregnancy checklist tasks were completed during this visit, and no tasks are pending completion.    US discussed today.  Questions answered to desired level of satisfaction  Verbalized understanding to all information and instructions provided.  Follow up in about 2 weeks (around 3/3/2025), or if symptoms worsen or fail to improve, for NICHO Head, DNP, CNM, WHNP-BC

## 2025-02-28 NOTE — PROCEDURES
OB ultrasound Note:    BPD- 34 weeks 0 day  HC-31 weeks 1 day  AC- 31 weeks 0 day  FL- 32 weeks 2 day    NERISSA- 10.54 cm    Fetal heart rate:  141 bpm    Fetal position- vertex  Placental location- posterior    Impression-    BIJAL April 13, 2025  Estimated gestational age 32 weeks 1 day  EFW 1814 g (3 lb 15 oz)  Pctl ( EFW) 38 th percentile      Biophysical Profile:    Fetal Movements- 2  Fetal breathing- 2  Fetal Tone- 2  Amniotic Fluid Volume- 2    Total - 8

## 2025-03-06 ENCOUNTER — HOSPITAL ENCOUNTER (EMERGENCY)
Facility: HOSPITAL | Age: 20
Discharge: HOME | End: 2025-03-06
Attending: OBSTETRICS & GYNECOLOGY
Payer: COMMERCIAL

## 2025-03-06 VITALS
RESPIRATION RATE: 18 BRPM | TEMPERATURE: 98 F | DIASTOLIC BLOOD PRESSURE: 71 MMHG | BODY MASS INDEX: 30.7 KG/M2 | HEIGHT: 60 IN | WEIGHT: 156.38 LBS | SYSTOLIC BLOOD PRESSURE: 112 MMHG | HEART RATE: 103 BPM | OXYGEN SATURATION: 99 %

## 2025-03-06 DIAGNOSIS — Z3A.35 35 WEEKS GESTATION OF PREGNANCY: ICD-10-CM

## 2025-03-06 DIAGNOSIS — R10.2 FEELING PELVIC PRESSURE IN PREGNANCY, ANTEPARTUM, THIRD TRIMESTER: Primary | ICD-10-CM

## 2025-03-06 DIAGNOSIS — O26.893 FEELING PELVIC PRESSURE IN PREGNANCY, ANTEPARTUM, THIRD TRIMESTER: Primary | ICD-10-CM

## 2025-03-06 PROBLEM — O47.03 FALSE LABOR BEFORE 37 COMPLETED WEEKS OF GESTATION IN THIRD TRIMESTER: Status: ACTIVE | Noted: 2025-03-06

## 2025-03-06 LAB
BACTERIA #/AREA URNS HPF: ABNORMAL /HPF
BILIRUB UR QL STRIP: NEGATIVE
CLARITY UR: CLEAR
COLOR UR: YELLOW
GLUCOSE UR STRIP-MCNC: NORMAL MG/DL
KETONES UR STRIP-SCNC: NEGATIVE MG/DL
LEUKOCYTE ESTERASE UR QL STRIP: NEGATIVE
MUCOUS, UA: ABNORMAL /LPF
NITRITE UR QL STRIP: NEGATIVE
PH UR STRIP: 6.5 PH UNITS
PROT UR QL STRIP: 30
RBC # UR STRIP: NEGATIVE /UL
RBC #/AREA URNS HPF: 4 /HPF
SP GR UR STRIP: 1.03
SQUAMOUS #/AREA URNS LPF: ABNORMAL /HPF
UROBILINOGEN UR STRIP-ACNC: NORMAL MG/DL
WBC #/AREA URNS HPF: 7 /HPF

## 2025-03-06 PROCEDURE — 81003 URINALYSIS AUTO W/O SCOPE: CPT | Performed by: OBSTETRICS & GYNECOLOGY

## 2025-03-06 PROCEDURE — 99284 EMERGENCY DEPT VISIT MOD MDM: CPT | Mod: 25

## 2025-03-06 PROCEDURE — 59025 FETAL NON-STRESS TEST: CPT

## 2025-03-06 NOTE — Clinical Note
"Arnaud "Arnaud" Juvencio was seen and treated in our emergency department on 3/6/2025.  She may return to work on 03/07/2025.       If you have any questions or concerns, please don't hesitate to call.      Dr. Hutchison/XIANG Riddle, RN RN    "

## 2025-03-06 NOTE — ED PROVIDER NOTES
Encounter Date: 3/6/2025    [facility] Physician: Won Hutchison   Primary OBGYN:Mavis Head CNM    Admit Diagnosis/Chief Complaint: Contractions  History     Chief Complaint   Patient presents with    Contractions     Patient is a 19-year-old  who presents at 35 weeks and 2 days with complaints of uterine contractions/pelvic pressure.  She denies headaches blurred vision nausea vomiting dysuria frequency and urgency.  She receives prenatal care with LOGAN Ortega and has her next scheduled appointment on 2025.        Obstetric HPI:  Patient reports occasional contractions, active fetal movement, absent vaginal bleeding , absent loss of fluid      Objective:     Vital Signs (Most Recent):  Temp: 98.3 °F (36.8 °C) (25 1014)  Pulse: 103 (25 1014)  Resp: 18 (25 1014)  BP: 112/71 (25 1014)  SpO2: 99 % (25 1014) Vital Signs (24h Range):  Temp:  [98.3 °F (36.8 °C)] 98.3 °F (36.8 °C)  Pulse:  [103] 103  Resp:  [18] 18  SpO2:  [99 %] 99 %  BP: (112)/(71) 112/71     Weight: 70.9 kg (156 lb 6.4 oz)  Body mass index is 30.54 kg/m².    FHT: 145  Cat 1 (reassuring)  TOCO: No contractions noted   No intake or output data in the 24 hours ending 25 1153    Cervical Exam:  Per nurse  Dilation:  0  Effacement:  0%  Station: -3  Presentation: Vertex     Significant Labs:  Recent Lab Results         25  1026        Appearance, UA Clear       Bacteria, UA Occasional       Bilirubin (UA) Negative       Color, UA Yellow       Glucose, UA Normal       Ketones, UA Negative       Leukocyte Esterase, UA Negative       Mucous Few       NITRITE UA Negative       Blood, UA Negative       pH, UA 6.5       Protein, UA 30       RBC, UA 4       Spec Grav UA 1.029       Squamous Epithelial Cells, UA Occasional       UROBILINOGEN UA Normal       WBC, UA 7             Review of patient's allergies indicates:  No Known Allergies  Past Medical History:   Diagnosis Date    Exposure to sexually transmitted  disease (STD) 07/20/2022    GC, CT, and trich     No past surgical history on file.  No family history on file.  Social History[1]  Review of Systems   Constitutional:  Negative for appetite change, chills, fatigue and fever.   HENT:  Negative for congestion.    Eyes:  Negative for visual disturbance.   Respiratory:  Negative for cough, chest tightness and shortness of breath.    Cardiovascular:  Negative for chest pain, palpitations and leg swelling.   Gastrointestinal:  Negative for abdominal distention, abdominal pain, blood in stool, constipation, diarrhea, nausea and vomiting.   Endocrine: Negative for cold intolerance, heat intolerance, polydipsia, polyphagia and polyuria.   Genitourinary:  Positive for pelvic pain. Negative for difficulty urinating, dyspareunia, dysuria, flank pain, frequency, urgency, vaginal bleeding, vaginal discharge and vaginal pain.   Musculoskeletal:  Negative for back pain.   Skin: Negative.    Neurological:  Negative for headaches.   Psychiatric/Behavioral:  Negative for agitation, behavioral problems and sleep disturbance.        Physical Exam     Initial Vitals [03/06/25 1014]   BP Pulse Resp Temp SpO2   112/71 103 18 98.3 °F (36.8 °C) 99 %      MAP       --         Physical Exam    Nursing note and vitals reviewed.  Constitutional: She appears well-developed and well-nourished. No distress.   HENT:   Head: Normocephalic and atraumatic.   Nose: Nose normal.   Eyes: EOM are normal. Pupils are equal, round, and reactive to light.   Neck:   Normal range of motion.  Cardiovascular:  Normal rate.           Pulmonary/Chest: No respiratory distress.   Abdominal: Abdomen is soft. There is no abdominal tenderness.   Musculoskeletal:         General: No edema.      Cervical back: Normal range of motion.     Neurological: She is alert and oriented to person, place, and time.   Skin: Skin is warm and dry.   Psychiatric: She has a normal mood and affect. Thought content normal.         ED  Course     Labs Reviewed   URINALYSIS - Abnormal       Result Value    Color, UA Yellow      Clarity, UA Clear      pH, UA 6.5      Leukocytes, UA Negative      Nitrites, UA Negative      Protein, UA 30 (*)     Glucose, UA Normal      Ketones, UA Negative      Urobilinogen, UA Normal      Bilirubin, UA Negative      Blood, UA Negative      Specific Gravity, UA 1.029     URINALYSIS, MICROSCOPIC - Abnormal    WBC, UA 7 (*)     RBC, UA 4 (*)     Bacteria, UA Occasional (*)     Squamous Epithelial Cells, UA Occasional (*)     Mucous Few (*)         Imaging Results    None     Nonstress test- reactive, category 1 tracing , no uterine contractions noted.     Medical Decision Making  Amount and/or Complexity of Data Reviewed  Labs: ordered.       Medications - No data to display              ED Course as of 25 1153   Thu Mar 06, 2025   1051 Patient is a 19-year-old  1 para 0 who presents at 35 weeks and 2 days with complaints of contractions/pelvic pressure.  She admits that she has been performing housework lately with family indicating that she may have overdone it.  She denies dysuria frequency urgency fever chills nausea vomiting vaginal bleeding or regular contractions.  Her cervix is closed thick and high she has a reactive nonstress test with no uterine contractions noted.  Vital signs are stable.  Urinalysis-was negative for nitrites ketones bilirubin blood and leukocyte esterase.  Thirty of protein was noted.  No evidence of  labor was noted.  The use of a maternity support belt was discussed and recommended.  Labor precautions given.  Patient will follow-up with her provider on 2020 5 as already scheduled, or may return to the OB ED on an as-needed basis. [JA]      ED Course User Index  [JA] Won Hutchison MD                 Clinical Impression:   Final diagnoses:  [Z3A.35] 35 weeks gestation of pregnancy  [O26.893, R10.2] Feeling pelvic pressure in pregnancy, antepartum, third  trimester (Primary)        ED Disposition Condition    Discharge Stable          ED Prescriptions    None       Follow-up Information       Follow up With Specialties Details Why Contact Info    Mavis Head CNM Obstetrics and Gynecology On 3/17/2025  [address] [place] [id number]  [phone]      [facility] Emergency Dept (OB) Emergency Medicine  As needed [address] [place] [id number]             Won Hutchison MD  03/06/25 1147         [1]   Social History  Tobacco Use    Smoking status: Every Day     Types: Vaping with nicotine, Vaping w/o nicotine     Passive exposure: Never    Smokeless tobacco: Never   Substance Use Topics    Alcohol use: Not Currently    Drug use: Yes     Frequency: 2.0 times per week     Types: Marijuana     Comment: before meal        Won Hutchison MD  03/06/25 4101

## 2025-03-06 NOTE — ED TRIAGE NOTES
Rec'd 20 y/o G1 at 35.2 weeks IUP gest to OB ed for c/o ctx's.  Pt denies any LOF or any bleeding, +fm voiced.

## 2025-03-17 ENCOUNTER — RESULTS FOLLOW-UP (OUTPATIENT)
Dept: OBSTETRICS AND GYNECOLOGY | Facility: CLINIC | Age: 20
End: 2025-03-17

## 2025-03-17 ENCOUNTER — ROUTINE PRENATAL (OUTPATIENT)
Dept: OBSTETRICS AND GYNECOLOGY | Facility: CLINIC | Age: 20
End: 2025-03-17
Payer: COMMERCIAL

## 2025-03-17 VITALS
SYSTOLIC BLOOD PRESSURE: 102 MMHG | DIASTOLIC BLOOD PRESSURE: 57 MMHG | HEART RATE: 90 BPM | BODY MASS INDEX: 31.52 KG/M2 | WEIGHT: 161.38 LBS

## 2025-03-17 DIAGNOSIS — Z3A.36 36 WEEKS GESTATION OF PREGNANCY: Primary | ICD-10-CM

## 2025-03-17 DIAGNOSIS — N89.8 VAGINAL DISCHARGE: ICD-10-CM

## 2025-03-17 DIAGNOSIS — A74.9 CHLAMYDIA: ICD-10-CM

## 2025-03-17 DIAGNOSIS — R11.2 NAUSEA AND VOMITING, UNSPECIFIED VOMITING TYPE: ICD-10-CM

## 2025-03-17 DIAGNOSIS — Z11.3 SCREEN FOR SEXUALLY TRANSMITTED DISEASES: ICD-10-CM

## 2025-03-17 DIAGNOSIS — R11.0 NAUSEA: ICD-10-CM

## 2025-03-17 DIAGNOSIS — Z72.51 HIGH RISK HETEROSEXUAL BEHAVIOR: ICD-10-CM

## 2025-03-17 DIAGNOSIS — N39.0 URINARY TRACT INFECTION WITHOUT HEMATURIA, SITE UNSPECIFIED: Primary | ICD-10-CM

## 2025-03-17 LAB
AMPHET UR QL SCN: NEGATIVE
BACTERIAL VAGINOSIS DNA (OHS): POSITIVE
BARBITURATES UR QL SCN: NEGATIVE
BASOPHILS # BLD AUTO: 0.02 K/UL (ref 0–0.2)
BASOPHILS NFR BLD AUTO: 0.2 % (ref 0–1)
BENZODIAZ METAB UR QL SCN: NEGATIVE
BILIRUB SERPL-MCNC: NORMAL MG/DL
BLOOD, POC UA: NORMAL
CANDIDA GLABRATA/KRUSEI DNA (OHS): NOT DETECTED
CANDIDA SPECIES DNA (OHS): DETECTED
CANNABINOIDS UR QL SCN: POSITIVE
CHLAMYDIA BY PCR: POSITIVE
COCAINE UR QL SCN: NEGATIVE
DIFFERENTIAL METHOD BLD: ABNORMAL
EOSINOPHIL # BLD AUTO: 0.06 K/UL (ref 0–0.5)
EOSINOPHIL NFR BLD AUTO: 0.6 % (ref 1–4)
ERYTHROCYTE [DISTWIDTH] IN BLOOD BY AUTOMATED COUNT: 13.1 % (ref 11.5–14.5)
GLUCOSE SERPL-MCNC: 116 MG/DL (ref 74–106)
GLUCOSE UR QL STRIP: 100
HCT VFR BLD AUTO: 37.1 % (ref 38–47)
HGB BLD-MCNC: 12.1 G/DL (ref 12–16)
IMM GRANULOCYTES # BLD AUTO: 0.05 K/UL (ref 0–0.04)
IMM GRANULOCYTES NFR BLD: 0.5 % (ref 0–0.4)
KETONES UR QL STRIP: NORMAL
LEUKOCYTE ESTERASE URINE, POC: NORMAL
LYMPHOCYTES # BLD AUTO: 2.54 K/UL (ref 1–4.8)
LYMPHOCYTES NFR BLD AUTO: 25.7 % (ref 27–41)
MCH RBC QN AUTO: 29.4 PG (ref 27–31)
MCHC RBC AUTO-ENTMCNC: 32.6 G/DL (ref 32–36)
MCV RBC AUTO: 90.3 FL (ref 80–96)
MONOCYTES # BLD AUTO: 0.55 K/UL (ref 0–0.8)
MONOCYTES NFR BLD AUTO: 5.6 % (ref 2–6)
MPC BLD CALC-MCNC: 10.5 FL (ref 9.4–12.4)
N. GONORRHOEAE (GC) BY PCR: ABNORMAL
NEUTROPHILS # BLD AUTO: 6.68 K/UL (ref 1.8–7.7)
NEUTROPHILS NFR BLD AUTO: 67.4 % (ref 53–65)
NITRITE, POC UA: NORMAL
NRBC # BLD AUTO: 0 X10E3/UL
NRBC, AUTO (.00): 0 %
OPIATES UR QL SCN: NEGATIVE
PCP UR QL SCN: NEGATIVE
PH, POC UA: 7
PLATELET # BLD AUTO: 296 K/UL (ref 150–400)
PROTEIN, POC: NORMAL
RBC # BLD AUTO: 4.11 M/UL (ref 4.2–5.4)
SPECIFIC GRAVITY, POC UA: 1.01
SYPHILIS AB INTERPRETATION: NORMAL
TRICHOMONAS VAGINALIS DNA (OHS): NOT DETECTED
UROBILINOGEN, POC UA: 0.2
WBC # BLD AUTO: 9.9 K/UL (ref 4.5–11)

## 2025-03-17 PROCEDURE — 80307 DRUG TEST PRSMV CHEM ANLYZR: CPT | Mod: ,,, | Performed by: CLINICAL MEDICAL LABORATORY

## 2025-03-17 PROCEDURE — 81515 NFCT DS BV&VAGINITIS DNA ALG: CPT | Mod: QW,,, | Performed by: CLINICAL MEDICAL LABORATORY

## 2025-03-17 PROCEDURE — 0502F SUBSEQUENT PRENATAL CARE: CPT | Mod: ,,, | Performed by: ADVANCED PRACTICE MIDWIFE

## 2025-03-17 PROCEDURE — 87591 N.GONORRHOEAE DNA AMP PROB: CPT | Mod: ,,, | Performed by: CLINICAL MEDICAL LABORATORY

## 2025-03-17 PROCEDURE — 87653 STREP B DNA AMP PROBE: CPT | Mod: ,,, | Performed by: CLINICAL MEDICAL LABORATORY

## 2025-03-17 PROCEDURE — 36415 COLL VENOUS BLD VENIPUNCTURE: CPT | Mod: ,,, | Performed by: ADVANCED PRACTICE MIDWIFE

## 2025-03-17 PROCEDURE — 96372 THER/PROPH/DIAG INJ SC/IM: CPT | Mod: ,,, | Performed by: ADVANCED PRACTICE MIDWIFE

## 2025-03-17 PROCEDURE — 87491 CHLMYD TRACH DNA AMP PROBE: CPT | Mod: ,,, | Performed by: CLINICAL MEDICAL LABORATORY

## 2025-03-17 PROCEDURE — 82950 GLUCOSE TEST: CPT | Mod: ,,, | Performed by: CLINICAL MEDICAL LABORATORY

## 2025-03-17 PROCEDURE — 85025 COMPLETE CBC W/AUTO DIFF WBC: CPT | Mod: ,,, | Performed by: CLINICAL MEDICAL LABORATORY

## 2025-03-17 PROCEDURE — 86780 TREPONEMA PALLIDUM: CPT | Mod: ,,, | Performed by: CLINICAL MEDICAL LABORATORY

## 2025-03-17 RX ORDER — METRONIDAZOLE 500 MG/1
500 TABLET ORAL 2 TIMES DAILY
Qty: 14 TABLET | Refills: 0 | Status: SHIPPED | OUTPATIENT
Start: 2025-03-17 | End: 2025-03-24

## 2025-03-17 RX ORDER — ONDANSETRON 2 MG/ML
4 INJECTION INTRAMUSCULAR; INTRAVENOUS
Status: COMPLETED | OUTPATIENT
Start: 2025-03-17 | End: 2025-03-17

## 2025-03-17 RX ORDER — NITROFURANTOIN 25; 75 MG/1; MG/1
100 CAPSULE ORAL 2 TIMES DAILY
Qty: 14 CAPSULE | Refills: 0 | Status: SHIPPED | OUTPATIENT
Start: 2025-03-17 | End: 2025-03-24

## 2025-03-17 RX ORDER — ONDANSETRON 2 MG/ML
4 INJECTION INTRAMUSCULAR; INTRAVENOUS
Status: DISCONTINUED | OUTPATIENT
Start: 2025-03-17 | End: 2025-03-17

## 2025-03-17 RX ORDER — TERCONAZOLE 4 MG/G
1 CREAM VAGINAL NIGHTLY
Qty: 45 G | Refills: 0 | Status: SHIPPED | OUTPATIENT
Start: 2025-03-17 | End: 2025-03-22

## 2025-03-17 RX ADMIN — ONDANSETRON 4 MG: 2 INJECTION INTRAMUSCULAR; INTRAVENOUS at 03:03

## 2025-03-17 NOTE — PROGRESS NOTES
19 y.o. female  at 36w6d   She c/o some vaginal discharge and some cramping. C/o blood in stools with having bowel movements- stool stoftners as needed, increase water nad fiber in diet.   Reports good fetal movement or fluttering. Denies any vaginal bleeding, leakage of fluid, cramping, contractions, or pressure.   Total weight gain/weight loss in pregnancy: 3.357 kg (7 lb 6.4 oz)     Vitals  BP: (!) 102/57  Pulse: 90  Weight: 73.2 kg (161 lb 6.4 oz)  Prenatal  Fundal Height (cm): 36 cm  Fetal Heart Rate: 132  Movement: Present  Edema  LLE Edema: None  RLE Edema: None  Facial: None  Additional Edema?: No  Cervical Exam  Dilation: 1  Effacement (%): 0  Station: -3  Presentation: Vertex  Station (Labor Curve): 8 cm  Dilation/Effacement/Station  Dilation: 1  Effacement (%): 0  Station: -3    Prenatal Labs:  Lab Results   Component Value Date    GROUPTRH O POS 09/10/2024    HGB 11.1 (L) 2024    HCT 33.4 (L) 2024     2024    SICKLE Negative 2024    HEPBSAG Non-Reactive 2024    [id number] Non-Reactive 2024    LABNGO Negative 2024    LABURIN >100,000 Escherichia coli (A) 2024       A: 36w6d           ICD-10-CM ICD-9-CM    1. 36 weeks gestation of pregnancy  Z3A.36 V22.2 CBC Auto Differential      POCT Urinalysis      Drug Screen, Urine      2. Screen for sexually transmitted diseases  Z11.3 V74.5 Strep B Screen, Antepartum      Vaginosis Screen by DNA Probe      Syphilis Antibody with reflex to RPR      Chlamydia/GC, PCR      Chlamydia/GC, PCR      Vaginosis Screen by DNA Probe      Strep B Screen, Antepartum      3. High risk heterosexual behavior  Z72.51 V69.2 Strep B Screen, Antepartum      Vaginosis Screen by DNA Probe      Syphilis Antibody with reflex to RPR      Chlamydia/GC, PCR      Chlamydia/GC, PCR      Vaginosis Screen by DNA Probe      Strep B Screen, Antepartum      4. Vaginal discharge  N89.8 623.5 metroNIDAZOLE (FLAGYL) 500 MG tablet       terconazole (TERAZOL 7) 0.4 % Crea      5. Nausea and vomiting, unspecified vomiting type  R11.2 787.01       6. Nausea  R11.0 787.02 ondansetron injection 4 mg          P: Bleeding, daily fetal kick counts, and  labor/labor precautions discussed.    The following were addressed during this visit:    33-36 Weeks  - Childbirth Education/Hospital Tours   - Breastfeeding   - Group B Strep Test/HIV/RPR   - Fetal Kick Counts/PIH/PTL precautions       Questions answered to desired level of satisfaction  Verbalized understanding to all information and instructions provided.  Follow up in about 1 week (around 3/24/2025), or if symptoms worsen or fail to improve, for TERESALeilani Head, DNP, CNM, WHNP-BC

## 2025-03-18 LAB — GROUP B STREP, PCR: POSITIVE

## 2025-03-18 RX ORDER — AZITHROMYCIN 500 MG/1
1000 TABLET, FILM COATED ORAL ONCE
Qty: 2 TABLET | Refills: 0 | Status: SHIPPED | OUTPATIENT
Start: 2025-03-18 | End: 2025-03-18

## 2025-04-02 ENCOUNTER — ANESTHESIA EVENT (OUTPATIENT)
Dept: OBSTETRICS AND GYNECOLOGY | Facility: HOSPITAL | Age: 20
End: 2025-04-02
Payer: COMMERCIAL

## 2025-04-02 ENCOUNTER — HOSPITAL ENCOUNTER (INPATIENT)
Facility: HOSPITAL | Age: 20
LOS: 3 days | Discharge: HOME | End: 2025-04-05
Attending: OBSTETRICS & GYNECOLOGY | Admitting: OBSTETRICS & GYNECOLOGY
Payer: COMMERCIAL

## 2025-04-02 ENCOUNTER — ROUTINE PRENATAL (OUTPATIENT)
Dept: OBSTETRICS AND GYNECOLOGY | Facility: CLINIC | Age: 20
End: 2025-04-02
Payer: COMMERCIAL

## 2025-04-02 ENCOUNTER — ANESTHESIA (OUTPATIENT)
Dept: OBSTETRICS AND GYNECOLOGY | Facility: HOSPITAL | Age: 20
End: 2025-04-02
Payer: COMMERCIAL

## 2025-04-02 VITALS
DIASTOLIC BLOOD PRESSURE: 52 MMHG | HEART RATE: 110 BPM | WEIGHT: 162 LBS | SYSTOLIC BLOOD PRESSURE: 114 MMHG | BODY MASS INDEX: 31.64 KG/M2

## 2025-04-02 DIAGNOSIS — O47.03 FALSE LABOR BEFORE 37 COMPLETED WEEKS OF GESTATION IN THIRD TRIMESTER: ICD-10-CM

## 2025-04-02 DIAGNOSIS — B96.89 BACTERIAL VAGINAL INFECTION: ICD-10-CM

## 2025-04-02 DIAGNOSIS — Z3A.39 39 WEEKS GESTATION OF PREGNANCY: ICD-10-CM

## 2025-04-02 DIAGNOSIS — B95.1 GROUP BETA STREP POSITIVE: ICD-10-CM

## 2025-04-02 DIAGNOSIS — F17.200 TOBACCO DEPENDENCY: ICD-10-CM

## 2025-04-02 DIAGNOSIS — R10.2 FEELING PELVIC PRESSURE IN PREGNANCY, ANTEPARTUM, THIRD TRIMESTER: ICD-10-CM

## 2025-04-02 DIAGNOSIS — O42.90 RUPTURED MEMBRANES, PROLONGED: ICD-10-CM

## 2025-04-02 DIAGNOSIS — A59.9 TRICHIMONIASIS: ICD-10-CM

## 2025-04-02 DIAGNOSIS — A74.9 CHLAMYDIA INFECTION: ICD-10-CM

## 2025-04-02 DIAGNOSIS — M54.50 ACUTE BILATERAL LOW BACK PAIN WITHOUT SCIATICA: ICD-10-CM

## 2025-04-02 DIAGNOSIS — M54.9 PREGNANCY RELATED BACK PAIN, ANTEPARTUM, SECOND TRIMESTER: ICD-10-CM

## 2025-04-02 DIAGNOSIS — O26.892 PREGNANCY RELATED BACK PAIN, ANTEPARTUM, SECOND TRIMESTER: ICD-10-CM

## 2025-04-02 DIAGNOSIS — K59.00 CONSTIPATION DURING PREGNANCY IN SECOND TRIMESTER: ICD-10-CM

## 2025-04-02 DIAGNOSIS — Z98.891 STATUS POST CESAREAN SECTION: ICD-10-CM

## 2025-04-02 DIAGNOSIS — A54.9 GONORRHEA: ICD-10-CM

## 2025-04-02 DIAGNOSIS — Z3A.35 35 WEEKS GESTATION OF PREGNANCY: ICD-10-CM

## 2025-04-02 DIAGNOSIS — N76.0 BACTERIAL VAGINAL INFECTION: ICD-10-CM

## 2025-04-02 DIAGNOSIS — Z98.891 STATUS POST CESAREAN DELIVERY: Primary | ICD-10-CM

## 2025-04-02 DIAGNOSIS — R11.2 NAUSEA AND VOMITING: ICD-10-CM

## 2025-04-02 DIAGNOSIS — O26.893 FEELING PELVIC PRESSURE IN PREGNANCY, ANTEPARTUM, THIRD TRIMESTER: ICD-10-CM

## 2025-04-02 DIAGNOSIS — O99.612 CONSTIPATION DURING PREGNANCY IN SECOND TRIMESTER: ICD-10-CM

## 2025-04-02 DIAGNOSIS — Z3A.39 39 WEEKS GESTATION OF PREGNANCY: Primary | ICD-10-CM

## 2025-04-02 LAB
ALBUMIN SERPL BCP-MCNC: 2.8 G/DL (ref 3.5–5)
ALBUMIN/GLOB SERPL: 0.6 {RATIO}
ALP SERPL-CCNC: 251 U/L (ref 40–150)
ALT SERPL W P-5'-P-CCNC: 9 U/L
ANION GAP SERPL CALCULATED.3IONS-SCNC: 10 MMOL/L (ref 7–16)
APTT PPP: 27.4 SECONDS (ref 25.2–37.3)
AST SERPL W P-5'-P-CCNC: 22 U/L (ref 11–45)
BASOPHILS # BLD AUTO: 0.02 K/UL (ref 0–0.2)
BASOPHILS NFR BLD AUTO: 0.1 % (ref 0–1)
BILIRUB SERPL-MCNC: 0.3 MG/DL
BUN SERPL-MCNC: 8 MG/DL (ref 7–19)
BUN/CREAT SERPL: 13 (ref 6–20)
CALCIUM SERPL-MCNC: 9.3 MG/DL (ref 8.4–10.2)
CHLORIDE SERPL-SCNC: 107 MMOL/L (ref 98–107)
CO2 SERPL-SCNC: 20 MMOL/L (ref 22–29)
CREAT SERPL-MCNC: 0.6 MG/DL (ref 0.55–1.02)
DIFFERENTIAL METHOD BLD: ABNORMAL
EGFR (NO RACE VARIABLE) (RUSH/TITUS): 132 ML/MIN/1.73M2
EOSINOPHIL # BLD AUTO: 0.02 K/UL (ref 0–0.5)
EOSINOPHIL NFR BLD AUTO: 0.1 % (ref 1–4)
ERYTHROCYTE [DISTWIDTH] IN BLOOD BY AUTOMATED COUNT: 13.1 % (ref 11.5–14.5)
FSP TITR PPP LA: 592 MG/DL (ref 283–506)
GLOBULIN SER-MCNC: 4.4 G/DL (ref 2–4)
GLUCOSE SERPL-MCNC: 93 MG/DL (ref 74–100)
HBV SURFACE AG SERPL QL IA: NORMAL
HCT VFR BLD AUTO: 36.2 % (ref 38–47)
HGB BLD-MCNC: 12.3 G/DL (ref 12–16)
HIV 1+O+2 AB SERPL QL: NORMAL
IMM GRANULOCYTES # BLD AUTO: 0.05 K/UL (ref 0–0.04)
IMM GRANULOCYTES NFR BLD: 0.3 % (ref 0–0.4)
INDIRECT COOMBS: NORMAL
INR BLD: 0.91
LYMPHOCYTES # BLD AUTO: 1.89 K/UL (ref 1–4.8)
LYMPHOCYTES NFR BLD AUTO: 12.8 % (ref 27–41)
LYMPHOCYTES NFR BLD MANUAL: 17 % (ref 27–41)
MCH RBC QN AUTO: 29.6 PG (ref 27–31)
MCHC RBC AUTO-ENTMCNC: 34 G/DL (ref 32–36)
MCV RBC AUTO: 87 FL (ref 80–96)
MONOCYTES # BLD AUTO: 0.93 K/UL (ref 0–0.8)
MONOCYTES NFR BLD AUTO: 6.3 % (ref 2–6)
MONOCYTES NFR BLD MANUAL: 5 % (ref 2–6)
MPC BLD CALC-MCNC: 10.5 FL (ref 9.4–12.4)
NEUTROPHILS # BLD AUTO: 11.83 K/UL (ref 1.8–7.7)
NEUTROPHILS NFR BLD AUTO: 80.4 % (ref 53–65)
NEUTS SEG NFR BLD MANUAL: 78 % (ref 50–62)
NRBC # BLD AUTO: 0 X10E3/UL
NRBC, AUTO (.00): 0 %
OVALOCYTES BLD QL SMEAR: ABNORMAL
PLATELET # BLD AUTO: 249 K/UL (ref 150–400)
PLATELET MORPHOLOGY: ABNORMAL
POTASSIUM SERPL-SCNC: 3.2 MMOL/L (ref 3.5–5.1)
PROT SERPL-MCNC: 7.2 G/DL (ref 6.4–8.3)
PROTHROMBIN TIME: 12.2 SECONDS (ref 11.7–14.7)
RBC # BLD AUTO: 4.16 M/UL (ref 4.2–5.4)
RH BLD: NORMAL
SODIUM SERPL-SCNC: 134 MMOL/L (ref 136–145)
SPECIMEN OUTDATE: NORMAL
SYPHILIS AB INTERPRETATION: NORMAL
WBC # BLD AUTO: 14.74 K/UL (ref 4.5–11)

## 2025-04-02 PROCEDURE — 36415 COLL VENOUS BLD VENIPUNCTURE: CPT | Performed by: ADVANCED PRACTICE MIDWIFE

## 2025-04-02 PROCEDURE — 51702 INSERT TEMP BLADDER CATH: CPT

## 2025-04-02 PROCEDURE — 59510 CESAREAN DELIVERY: CPT | Mod: ,,, | Performed by: OBSTETRICS & GYNECOLOGY

## 2025-04-02 PROCEDURE — 36004725 HC OB OR TIME LEV III - EA ADD 15 MIN: Performed by: OBSTETRICS & GYNECOLOGY

## 2025-04-02 PROCEDURE — 86780 TREPONEMA PALLIDUM: CPT | Performed by: ADVANCED PRACTICE MIDWIFE

## 2025-04-02 PROCEDURE — 63600175 PHARM REV CODE 636 W HCPCS: Mod: JZ,TB | Performed by: ANESTHESIOLOGY

## 2025-04-02 PROCEDURE — 85610 PROTHROMBIN TIME: CPT | Performed by: ADVANCED PRACTICE MIDWIFE

## 2025-04-02 PROCEDURE — 63600175 PHARM REV CODE 636 W HCPCS

## 2025-04-02 PROCEDURE — 25000003 PHARM REV CODE 250: Performed by: ADVANCED PRACTICE MIDWIFE

## 2025-04-02 PROCEDURE — 63600175 PHARM REV CODE 636 W HCPCS: Performed by: ADVANCED PRACTICE MIDWIFE

## 2025-04-02 PROCEDURE — 0502F SUBSEQUENT PRENATAL CARE: CPT | Mod: ,,, | Performed by: ADVANCED PRACTICE MIDWIFE

## 2025-04-02 PROCEDURE — 27201423 OPTIME MED/SURG SUP & DEVICES STERILE SUPPLY: Performed by: OBSTETRICS & GYNECOLOGY

## 2025-04-02 PROCEDURE — 25000003 PHARM REV CODE 250: Performed by: ANESTHESIOLOGY

## 2025-04-02 PROCEDURE — 80053 COMPREHEN METABOLIC PANEL: CPT | Performed by: ADVANCED PRACTICE MIDWIFE

## 2025-04-02 PROCEDURE — 71000033 HC RECOVERY, INTIAL HOUR: Performed by: OBSTETRICS & GYNECOLOGY

## 2025-04-02 PROCEDURE — 86762 RUBELLA ANTIBODY: CPT | Performed by: ADVANCED PRACTICE MIDWIFE

## 2025-04-02 PROCEDURE — 37000008 HC ANESTHESIA 1ST 15 MINUTES: Performed by: OBSTETRICS & GYNECOLOGY

## 2025-04-02 PROCEDURE — 87340 HEPATITIS B SURFACE AG IA: CPT | Performed by: ADVANCED PRACTICE MIDWIFE

## 2025-04-02 PROCEDURE — 85384 FIBRINOGEN ACTIVITY: CPT | Performed by: ADVANCED PRACTICE MIDWIFE

## 2025-04-02 PROCEDURE — 36004724 HC OB OR TIME LEV III - 1ST 15 MIN: Performed by: OBSTETRICS & GYNECOLOGY

## 2025-04-02 PROCEDURE — 86901 BLOOD TYPING SEROLOGIC RH(D): CPT | Performed by: ADVANCED PRACTICE MIDWIFE

## 2025-04-02 PROCEDURE — 85025 COMPLETE CBC W/AUTO DIFF WBC: CPT | Performed by: ADVANCED PRACTICE MIDWIFE

## 2025-04-02 PROCEDURE — 63600175 PHARM REV CODE 636 W HCPCS: Performed by: NURSE ANESTHETIST, CERTIFIED REGISTERED

## 2025-04-02 PROCEDURE — 11000001 HC ACUTE MED/SURG PRIVATE ROOM

## 2025-04-02 PROCEDURE — 85730 THROMBOPLASTIN TIME PARTIAL: CPT | Performed by: ADVANCED PRACTICE MIDWIFE

## 2025-04-02 PROCEDURE — 37000009 HC ANESTHESIA EA ADD 15 MINS: Performed by: OBSTETRICS & GYNECOLOGY

## 2025-04-02 PROCEDURE — 87389 HIV-1 AG W/HIV-1&-2 AB AG IA: CPT | Performed by: ADVANCED PRACTICE MIDWIFE

## 2025-04-02 PROCEDURE — 71000039 HC RECOVERY, EACH ADD'L HOUR: Performed by: OBSTETRICS & GYNECOLOGY

## 2025-04-02 RX ORDER — OXYTOCIN 10 [USP'U]/ML
10 INJECTION, SOLUTION INTRAMUSCULAR; INTRAVENOUS ONCE AS NEEDED
Status: CANCELLED | OUTPATIENT
Start: 2025-04-02 | End: 2036-08-29

## 2025-04-02 RX ORDER — SODIUM CITRATE AND CITRIC ACID MONOHYDRATE 334; 500 MG/5ML; MG/5ML
30 SOLUTION ORAL
Status: DISCONTINUED | OUTPATIENT
Start: 2025-04-02 | End: 2025-04-05 | Stop reason: HOSPADM

## 2025-04-02 RX ORDER — CARBOPROST TROMETHAMINE 250 UG/ML
250 INJECTION, SOLUTION INTRAMUSCULAR
Status: CANCELLED | OUTPATIENT
Start: 2025-04-02

## 2025-04-02 RX ORDER — MISOPROSTOL 200 UG/1
800 TABLET ORAL ONCE AS NEEDED
Status: DISCONTINUED | OUTPATIENT
Start: 2025-04-02 | End: 2025-04-05 | Stop reason: HOSPADM

## 2025-04-02 RX ORDER — MISOPROSTOL 100 UG/1
800 TABLET ORAL ONCE AS NEEDED
Status: CANCELLED | OUTPATIENT
Start: 2025-04-02

## 2025-04-02 RX ORDER — SIMETHICONE 80 MG
1 TABLET,CHEWABLE ORAL 4 TIMES DAILY PRN
Status: CANCELLED | OUTPATIENT
Start: 2025-04-02

## 2025-04-02 RX ORDER — ONDANSETRON 4 MG/1
8 TABLET, ORALLY DISINTEGRATING ORAL EVERY 8 HOURS PRN
Status: DISCONTINUED | OUTPATIENT
Start: 2025-04-02 | End: 2025-04-05 | Stop reason: HOSPADM

## 2025-04-02 RX ORDER — OXYTOCIN/0.9 % SODIUM CHLORIDE 15/250 ML
95 PLASTIC BAG, INJECTION (ML) INTRAVENOUS CONTINUOUS PRN
Status: DISCONTINUED | OUTPATIENT
Start: 2025-04-02 | End: 2025-04-05 | Stop reason: HOSPADM

## 2025-04-02 RX ORDER — DIPHENOXYLATE HYDROCHLORIDE AND ATROPINE SULFATE 2.5; .025 MG/1; MG/1
2 TABLET ORAL EVERY 6 HOURS PRN
Status: CANCELLED | OUTPATIENT
Start: 2025-04-02

## 2025-04-02 RX ORDER — ONDANSETRON 4 MG/1
8 TABLET, ORALLY DISINTEGRATING ORAL EVERY 8 HOURS PRN
Status: CANCELLED | OUTPATIENT
Start: 2025-04-02

## 2025-04-02 RX ORDER — KETOROLAC TROMETHAMINE 30 MG/ML
INJECTION, SOLUTION INTRAMUSCULAR; INTRAVENOUS
Status: DISCONTINUED | OUTPATIENT
Start: 2025-04-02 | End: 2025-04-02

## 2025-04-02 RX ORDER — ADHESIVE BANDAGE
30 BANDAGE TOPICAL 2 TIMES DAILY PRN
Status: DISCONTINUED | OUTPATIENT
Start: 2025-04-03 | End: 2025-04-05 | Stop reason: HOSPADM

## 2025-04-02 RX ORDER — SIMETHICONE 80 MG
1 TABLET,CHEWABLE ORAL EVERY 6 HOURS PRN
Status: DISCONTINUED | OUTPATIENT
Start: 2025-04-02 | End: 2025-04-05 | Stop reason: HOSPADM

## 2025-04-02 RX ORDER — METHYLERGONOVINE MALEATE 0.2 MG/ML
200 INJECTION INTRAVENOUS ONCE AS NEEDED
Status: DISCONTINUED | OUTPATIENT
Start: 2025-04-02 | End: 2025-04-05 | Stop reason: HOSPADM

## 2025-04-02 RX ORDER — CALCIUM CARBONATE 200(500)MG
500 TABLET,CHEWABLE ORAL 3 TIMES DAILY PRN
Status: DISCONTINUED | OUTPATIENT
Start: 2025-04-02 | End: 2025-04-05 | Stop reason: HOSPADM

## 2025-04-02 RX ORDER — FAMOTIDINE 10 MG/ML
INJECTION, SOLUTION INTRAVENOUS
Status: COMPLETED
Start: 2025-04-02 | End: 2025-04-02

## 2025-04-02 RX ORDER — METHYLERGONOVINE MALEATE 0.2 MG/ML
200 INJECTION INTRAVENOUS ONCE AS NEEDED
Status: CANCELLED | OUTPATIENT
Start: 2025-04-02 | End: 2036-08-29

## 2025-04-02 RX ORDER — CEFAZOLIN 2 G/1
2 INJECTION, POWDER, FOR SOLUTION INTRAMUSCULAR; INTRAVENOUS
Status: DISCONTINUED | OUTPATIENT
Start: 2025-04-02 | End: 2025-04-05 | Stop reason: HOSPADM

## 2025-04-02 RX ORDER — OXYTOCIN 10 [USP'U]/ML
10 INJECTION, SOLUTION INTRAMUSCULAR; INTRAVENOUS ONCE AS NEEDED
Status: DISCONTINUED | OUTPATIENT
Start: 2025-04-02 | End: 2025-04-05 | Stop reason: HOSPADM

## 2025-04-02 RX ORDER — HYDROCODONE BITARTRATE AND ACETAMINOPHEN 7.5; 325 MG/1; MG/1
1 TABLET ORAL EVERY 4 HOURS PRN
Status: DISCONTINUED | OUTPATIENT
Start: 2025-04-02 | End: 2025-04-05 | Stop reason: HOSPADM

## 2025-04-02 RX ORDER — MORPHINE SULFATE 2 MG/ML
2 INJECTION, SOLUTION INTRAMUSCULAR; INTRAVENOUS
Status: DISCONTINUED | OUTPATIENT
Start: 2025-04-02 | End: 2025-04-03

## 2025-04-02 RX ORDER — SODIUM CHLORIDE 9 MG/ML
INJECTION, SOLUTION INTRAVENOUS
Status: CANCELLED | OUTPATIENT
Start: 2025-04-02

## 2025-04-02 RX ORDER — OXYTOCIN/0.9 % SODIUM CHLORIDE 15/250 ML
0-32 PLASTIC BAG, INJECTION (ML) INTRAVENOUS CONTINUOUS
Status: DISCONTINUED | OUTPATIENT
Start: 2025-04-02 | End: 2025-04-03

## 2025-04-02 RX ORDER — ACETAMINOPHEN 325 MG/1
650 TABLET ORAL EVERY 6 HOURS PRN
Status: CANCELLED | OUTPATIENT
Start: 2025-04-02

## 2025-04-02 RX ORDER — OXYTOCIN-SODIUM CHLORIDE 0.9% IV SOLN 30 UNIT/500ML 30-0.9/5 UT/ML-%
30 SOLUTION INTRAVENOUS ONCE AS NEEDED
Status: CANCELLED | OUTPATIENT
Start: 2025-04-02 | End: 2036-08-29

## 2025-04-02 RX ORDER — SODIUM CHLORIDE, SODIUM LACTATE, POTASSIUM CHLORIDE, CALCIUM CHLORIDE 600; 310; 30; 20 MG/100ML; MG/100ML; MG/100ML; MG/100ML
INJECTION, SOLUTION INTRAVENOUS CONTINUOUS
Status: DISCONTINUED | OUTPATIENT
Start: 2025-04-02 | End: 2025-04-03

## 2025-04-02 RX ORDER — OXYTOCIN-SODIUM CHLORIDE 0.9% IV SOLN 30 UNIT/500ML 30-0.9/5 UT/ML-%
30 SOLUTION INTRAVENOUS ONCE AS NEEDED
Status: DISCONTINUED | OUTPATIENT
Start: 2025-04-02 | End: 2025-04-05 | Stop reason: HOSPADM

## 2025-04-02 RX ORDER — CEFAZOLIN 2 G/1
INJECTION, POWDER, FOR SOLUTION INTRAMUSCULAR; INTRAVENOUS
Status: COMPLETED
Start: 2025-04-02 | End: 2025-04-02

## 2025-04-02 RX ORDER — BISACODYL 10 MG/1
10 SUPPOSITORY RECTAL ONCE AS NEEDED
Status: DISCONTINUED | OUTPATIENT
Start: 2025-04-02 | End: 2025-04-05 | Stop reason: HOSPADM

## 2025-04-02 RX ORDER — MISOPROSTOL 200 UG/1
800 TABLET ORAL
Status: DISCONTINUED | OUTPATIENT
Start: 2025-04-02 | End: 2025-04-05 | Stop reason: HOSPADM

## 2025-04-02 RX ORDER — CARBOPROST TROMETHAMINE 250 UG/ML
250 INJECTION, SOLUTION INTRAMUSCULAR
Status: DISCONTINUED | OUTPATIENT
Start: 2025-04-02 | End: 2025-04-05 | Stop reason: HOSPADM

## 2025-04-02 RX ORDER — MORPHINE SULFATE 1 MG/ML
INJECTION, SOLUTION EPIDURAL; INTRATHECAL; INTRAVENOUS
Status: DISCONTINUED | OUTPATIENT
Start: 2025-04-02 | End: 2025-04-02

## 2025-04-02 RX ORDER — TRANEXAMIC ACID 10 MG/ML
1000 INJECTION, SOLUTION INTRAVENOUS EVERY 30 MIN PRN
Status: DISCONTINUED | OUTPATIENT
Start: 2025-04-02 | End: 2025-04-05 | Stop reason: HOSPADM

## 2025-04-02 RX ORDER — OXYTOCIN 10 [USP'U]/ML
INJECTION, SOLUTION INTRAMUSCULAR; INTRAVENOUS
Status: DISCONTINUED | OUTPATIENT
Start: 2025-04-02 | End: 2025-04-02

## 2025-04-02 RX ORDER — ONDANSETRON HYDROCHLORIDE 2 MG/ML
INJECTION, SOLUTION INTRAVENOUS
Status: DISCONTINUED | OUTPATIENT
Start: 2025-04-02 | End: 2025-04-02

## 2025-04-02 RX ORDER — LIDOCAINE HYDROCHLORIDE 10 MG/ML
10 INJECTION, SOLUTION INFILTRATION; PERINEURAL ONCE AS NEEDED
Status: CANCELLED | OUTPATIENT
Start: 2025-04-02 | End: 2036-08-29

## 2025-04-02 RX ORDER — FAMOTIDINE 10 MG/ML
20 INJECTION, SOLUTION INTRAVENOUS
Status: DISCONTINUED | OUTPATIENT
Start: 2025-04-02 | End: 2025-04-05 | Stop reason: HOSPADM

## 2025-04-02 RX ORDER — MUPIROCIN 20 MG/G
OINTMENT TOPICAL
Status: DISCONTINUED | OUTPATIENT
Start: 2025-04-02 | End: 2025-04-05 | Stop reason: HOSPADM

## 2025-04-02 RX ORDER — OXYCODONE HYDROCHLORIDE 5 MG/1
10 TABLET ORAL EVERY 4 HOURS PRN
Status: DISCONTINUED | OUTPATIENT
Start: 2025-04-02 | End: 2025-04-03

## 2025-04-02 RX ORDER — OXYTOCIN-SODIUM CHLORIDE 0.9% IV SOLN 30 UNIT/500ML 30-0.9/5 UT/ML-%
10 SOLUTION INTRAVENOUS ONCE AS NEEDED
Status: DISCONTINUED | OUTPATIENT
Start: 2025-04-02 | End: 2025-04-05 | Stop reason: HOSPADM

## 2025-04-02 RX ORDER — PRENATAL WITH FERROUS FUM AND FOLIC ACID 3080; 920; 120; 400; 22; 1.84; 3; 20; 10; 1; 12; 200; 27; 25; 2 [IU]/1; [IU]/1; MG/1; [IU]/1; MG/1; MG/1; MG/1; MG/1; MG/1; MG/1; UG/1; MG/1; MG/1; MG/1; MG/1
1 TABLET ORAL DAILY
Status: DISCONTINUED | OUTPATIENT
Start: 2025-04-03 | End: 2025-04-05 | Stop reason: HOSPADM

## 2025-04-02 RX ORDER — OXYTOCIN/0.9 % SODIUM CHLORIDE 15/250 ML
95 PLASTIC BAG, INJECTION (ML) INTRAVENOUS ONCE AS NEEDED
Status: COMPLETED | OUTPATIENT
Start: 2025-04-02 | End: 2025-04-02

## 2025-04-02 RX ORDER — MUPIROCIN 20 MG/G
OINTMENT TOPICAL 2 TIMES DAILY
Status: DISCONTINUED | OUTPATIENT
Start: 2025-04-02 | End: 2025-04-05 | Stop reason: HOSPADM

## 2025-04-02 RX ORDER — DOCUSATE SODIUM 100 MG/1
200 CAPSULE, LIQUID FILLED ORAL 2 TIMES DAILY
Status: DISCONTINUED | OUTPATIENT
Start: 2025-04-02 | End: 2025-04-05 | Stop reason: HOSPADM

## 2025-04-02 RX ORDER — MORPHINE SULFATE 4 MG/ML
4 INJECTION, SOLUTION INTRAMUSCULAR; INTRAVENOUS
Status: DISCONTINUED | OUTPATIENT
Start: 2025-04-02 | End: 2025-04-03

## 2025-04-02 RX ORDER — OXYTOCIN-SODIUM CHLORIDE 0.9% IV SOLN 30 UNIT/500ML 30-0.9/5 UT/ML-%
10 SOLUTION INTRAVENOUS ONCE AS NEEDED
Status: CANCELLED | OUTPATIENT
Start: 2025-04-02 | End: 2036-08-29

## 2025-04-02 RX ORDER — DIPHENOXYLATE HYDROCHLORIDE AND ATROPINE SULFATE 2.5; .025 MG/1; MG/1
2 TABLET ORAL EVERY 6 HOURS PRN
Status: DISCONTINUED | OUTPATIENT
Start: 2025-04-02 | End: 2025-04-05 | Stop reason: HOSPADM

## 2025-04-02 RX ORDER — PROCHLORPERAZINE EDISYLATE 5 MG/ML
5 INJECTION INTRAMUSCULAR; INTRAVENOUS EVERY 6 HOURS PRN
Status: DISCONTINUED | OUTPATIENT
Start: 2025-04-02 | End: 2025-04-05 | Stop reason: HOSPADM

## 2025-04-02 RX ORDER — KETOROLAC TROMETHAMINE 30 MG/ML
30 INJECTION, SOLUTION INTRAMUSCULAR; INTRAVENOUS EVERY 6 HOURS
Status: DISCONTINUED | OUTPATIENT
Start: 2025-04-02 | End: 2025-04-03

## 2025-04-02 RX ORDER — ACETAMINOPHEN 325 MG/1
650 TABLET ORAL EVERY 6 HOURS
Status: DISCONTINUED | OUTPATIENT
Start: 2025-04-02 | End: 2025-04-03

## 2025-04-02 RX ORDER — OXYCODONE HYDROCHLORIDE 5 MG/1
5 TABLET ORAL EVERY 4 HOURS PRN
Status: DISCONTINUED | OUTPATIENT
Start: 2025-04-02 | End: 2025-04-03

## 2025-04-02 RX ORDER — OXYTOCIN/0.9 % SODIUM CHLORIDE 15/250 ML
95 PLASTIC BAG, INJECTION (ML) INTRAVENOUS ONCE AS NEEDED
Status: DISCONTINUED | OUTPATIENT
Start: 2025-04-02 | End: 2025-04-05 | Stop reason: HOSPADM

## 2025-04-02 RX ORDER — MISOPROSTOL 100 UG/1
800 TABLET ORAL ONCE AS NEEDED
Status: CANCELLED | OUTPATIENT
Start: 2025-04-02 | End: 2036-08-29

## 2025-04-02 RX ORDER — SODIUM CHLORIDE 0.9 % (FLUSH) 0.9 %
10 SYRINGE (ML) INJECTION
Status: DISCONTINUED | OUTPATIENT
Start: 2025-04-02 | End: 2025-04-05 | Stop reason: HOSPADM

## 2025-04-02 RX ORDER — CALCIUM CARBONATE 200(500)MG
500 TABLET,CHEWABLE ORAL 3 TIMES DAILY PRN
Status: CANCELLED | OUTPATIENT
Start: 2025-04-02

## 2025-04-02 RX ORDER — SODIUM CHLORIDE, SODIUM LACTATE, POTASSIUM CHLORIDE, CALCIUM CHLORIDE 600; 310; 30; 20 MG/100ML; MG/100ML; MG/100ML; MG/100ML
INJECTION, SOLUTION INTRAVENOUS CONTINUOUS
Status: CANCELLED | OUTPATIENT
Start: 2025-04-02

## 2025-04-02 RX ORDER — HYDROCORTISONE 25 MG/G
CREAM TOPICAL 3 TIMES DAILY PRN
Status: DISCONTINUED | OUTPATIENT
Start: 2025-04-02 | End: 2025-04-05 | Stop reason: HOSPADM

## 2025-04-02 RX ORDER — SIMETHICONE 80 MG
1 TABLET,CHEWABLE ORAL 4 TIMES DAILY PRN
Status: DISCONTINUED | OUTPATIENT
Start: 2025-04-02 | End: 2025-04-05 | Stop reason: HOSPADM

## 2025-04-02 RX ORDER — SODIUM CHLORIDE 9 MG/ML
INJECTION, SOLUTION INTRAVENOUS
Status: DISCONTINUED | OUTPATIENT
Start: 2025-04-02 | End: 2025-04-05 | Stop reason: HOSPADM

## 2025-04-02 RX ORDER — ACETAMINOPHEN 325 MG/1
650 TABLET ORAL EVERY 6 HOURS PRN
Status: DISCONTINUED | OUTPATIENT
Start: 2025-04-02 | End: 2025-04-05 | Stop reason: HOSPADM

## 2025-04-02 RX ORDER — METHYLERGONOVINE MALEATE 0.2 MG/ML
200 INJECTION INTRAVENOUS
Status: DISCONTINUED | OUTPATIENT
Start: 2025-04-02 | End: 2025-04-05 | Stop reason: HOSPADM

## 2025-04-02 RX ORDER — DIPHENHYDRAMINE HCL 25 MG
25 CAPSULE ORAL EVERY 4 HOURS PRN
Status: DISCONTINUED | OUTPATIENT
Start: 2025-04-02 | End: 2025-04-05 | Stop reason: HOSPADM

## 2025-04-02 RX ORDER — ONDANSETRON HYDROCHLORIDE 2 MG/ML
4 INJECTION, SOLUTION INTRAVENOUS EVERY 6 HOURS PRN
Status: ACTIVE | OUTPATIENT
Start: 2025-04-02 | End: 2025-04-03

## 2025-04-02 RX ORDER — IBUPROFEN 800 MG/1
800 TABLET ORAL EVERY 8 HOURS PRN
Status: DISCONTINUED | OUTPATIENT
Start: 2025-04-02 | End: 2025-04-05 | Stop reason: HOSPADM

## 2025-04-02 RX ORDER — LIDOCAINE HYDROCHLORIDE 10 MG/ML
10 INJECTION, SOLUTION INFILTRATION; PERINEURAL ONCE AS NEEDED
Status: DISCONTINUED | OUTPATIENT
Start: 2025-04-02 | End: 2025-04-05 | Stop reason: HOSPADM

## 2025-04-02 RX ORDER — AMOXICILLIN 250 MG
1 CAPSULE ORAL NIGHTLY PRN
Status: DISCONTINUED | OUTPATIENT
Start: 2025-04-02 | End: 2025-04-05 | Stop reason: HOSPADM

## 2025-04-02 RX ADMIN — KETOROLAC TROMETHAMINE 30 MG: 30 INJECTION, SOLUTION INTRAMUSCULAR at 11:04

## 2025-04-02 RX ADMIN — MORPHINE SULFATE 2 MG: 1 INJECTION, SOLUTION EPIDURAL; INTRATHECAL; INTRAVENOUS at 04:04

## 2025-04-02 RX ADMIN — AMPICILLIN SODIUM 2 G: 2 INJECTION, POWDER, FOR SOLUTION INTRAMUSCULAR; INTRAVENOUS at 02:04

## 2025-04-02 RX ADMIN — ONDANSETRON 4 MG: 2 INJECTION INTRAMUSCULAR; INTRAVENOUS at 04:04

## 2025-04-02 RX ADMIN — KETOROLAC TROMETHAMINE 30 MG: 30 INJECTION, SOLUTION INTRAMUSCULAR; INTRAVENOUS at 04:04

## 2025-04-02 RX ADMIN — CEFAZOLIN 2000 MG: 2 INJECTION, POWDER, FOR SOLUTION INTRAMUSCULAR; INTRAVENOUS at 04:04

## 2025-04-02 RX ADMIN — OXYCODONE 10 MG: 5 TABLET ORAL at 09:04

## 2025-04-02 RX ADMIN — MORPHINE SULFATE 0.25 MG: 1 INJECTION, SOLUTION EPIDURAL; INTRATHECAL; INTRAVENOUS at 04:04

## 2025-04-02 RX ADMIN — FAMOTIDINE 20 MG: 10 INJECTION, SOLUTION INTRAVENOUS at 04:04

## 2025-04-02 RX ADMIN — OXYTOCIN 40 UNITS: 10 INJECTION, SOLUTION INTRAMUSCULAR; INTRAVENOUS at 04:04

## 2025-04-02 RX ADMIN — SODIUM CHLORIDE, POTASSIUM CHLORIDE, SODIUM LACTATE AND CALCIUM CHLORIDE: 600; 310; 30; 20 INJECTION, SOLUTION INTRAVENOUS at 03:04

## 2025-04-02 RX ADMIN — OXYTOCIN 95 MILLI-UNITS/MIN: 10 INJECTION INTRAVENOUS at 05:04

## 2025-04-02 RX ADMIN — ONDANSETRON 4 MG: 2 INJECTION INTRAMUSCULAR; INTRAVENOUS at 05:04

## 2025-04-02 NOTE — PLAN OF CARE
Problem: Adult Inpatient Plan of Care  Goal: Plan of Care Review  4/2/2025 1807 by Stephanie Brewer RN  Outcome: Met  4/2/2025 1451 by Stephanie Brewer RN  Outcome: Progressing  Goal: Patient-Specific Goal (Individualized)  4/2/2025 1807 by Stephanie Brewer RN  Outcome: Met  4/2/2025 1451 by Stephanie Brewer RN  Outcome: Progressing  Goal: Absence of Hospital-Acquired Illness or Injury  4/2/2025 1807 by Stephanie Brewer RN  Outcome: Met  4/2/2025 1451 by Stephanie Brewer RN  Outcome: Progressing  Goal: Optimal Comfort and Wellbeing  4/2/2025 1807 by Stephanie Brewer RN  Outcome: Met  4/2/2025 1451 by Stephanie Brewer RN  Outcome: Progressing  Goal: Readiness for Transition of Care  4/2/2025 1807 by Stephanie Brewer RN  Outcome: Met  4/2/2025 1451 by Stephanie Brewer RN  Outcome: Progressing     Problem: Labor  Goal: Hemostasis  4/2/2025 1807 by Stephanie Brewer RN  Outcome: Met  4/2/2025 1451 by Stephanie Brewer RN  Outcome: Progressing  Goal: Stable Fetal Wellbeing  4/2/2025 1807 by Stephanie Brewer RN  Outcome: Met  4/2/2025 1451 by Stephanie Brewer RN  Outcome: Progressing  Goal: Effective Progression to Delivery  4/2/2025 1807 by Stephanie Brewer RN  Outcome: Met  4/2/2025 1451 by Stephanie Brewer RN  Outcome: Progressing  Goal: Absence of Infection Signs and Symptoms  4/2/2025 1807 by Stephanie Brewer RN  Outcome: Met  4/2/2025 1451 by Stephanie Brewer RN  Outcome: Progressing  Goal: Acceptable Pain Control  4/2/2025 1807 by Stephanie Brewer RN  Outcome: Met  4/2/2025 1451 by Stephanie Brewer, RN  Outcome: Progressing  Goal: Normal Uterine Contraction Pattern  4/2/2025 1807 by Stephanie Brewer, RN  Outcome: Met  4/2/2025 1451 by Stephanie Brewer, RN  Outcome: Progressing     Problem:  Infection  Goal: Absence of Infection Signs and Symptoms  Outcome: Met     Problem:  Fall Injury Risk  Goal: Absence of Fall, Infant Drop and Related Injury  Outcome: Met     Problem: Wound  Goal: Optimal Coping  Outcome: Met  Goal: Optimal Functional Ability  Outcome: Met  Goal: Absence of Infection Signs and Symptoms  Outcome: Met  Goal: Improved Oral Intake  Outcome: Met  Goal: Optimal Pain Control and Function  Outcome: Met  Goal: Skin Health and Integrity  Outcome: Met  Goal: Optimal Wound Healing  Outcome: Met

## 2025-04-02 NOTE — ANESTHESIA PREPROCEDURE EVALUATION
04/02/2025  Arnaud Henning is a 19 y.o., female.          I have reviewed the Patient Summary Reports.     I have reviewed the Nursing Notes. I have reviewed the NPO Status.   I have reviewed the Medications.     Review of Systems  Anesthesia Hx:  No problems with previous Anesthesia                Social:  Non-Smoker, No Alcohol Use       Hematology/Oncology:  Hematology Normal   Oncology Normal                                   EENT/Dental:  EENT/Dental Normal           Cardiovascular:  Cardiovascular Normal                                              Pulmonary:  Pulmonary Normal                       Renal/:  Renal/ Normal                 Hepatic/GI:  Hepatic/GI Normal                    Musculoskeletal:  Musculoskeletal Normal                OB/GYN/PEDS:  IUP    Fetal Intolerance of Labor           Neurological:  Neurology Normal                                      Endocrine:  Endocrine Normal            Dermatological:  Skin Normal    Psych:  Psychiatric Normal                    Physical Exam  General: Well nourished, Cooperative, Alert and Oriented    Airway:  Mallampati: II / II  Mouth Opening: Normal  TM Distance: Normal  Neck ROM: Normal ROM    Dental:  Intact    Chest/Lungs:  Clear to auscultation    Heart:  Rate: Normal  Rhythm: Regular Rhythm  Sounds: Normal        Chemistry        Component Value Date/Time     (L) 04/02/2025 1451    K 3.2 (L) 04/02/2025 1451     04/02/2025 1451    CO2 20 (L) 04/02/2025 1451    BUN 8 04/02/2025 1451    CREATININE 0.60 04/02/2025 1451    GLU 93 04/02/2025 1451        Component Value Date/Time    CALCIUM 9.3 04/02/2025 1451    ALKPHOS 251 (H) 04/02/2025 1451    AST 22 04/02/2025 1451    ALT 9 04/02/2025 1451    BILITOT 0.3 04/02/2025 1451        Lab Results   Component Value Date    WBC 14.74 (H) 04/02/2025    HGB 12.3 04/02/2025    HCT 36.2 (L)  04/02/2025     04/02/2025     No results found for this or any previous visit.      Anesthesia Plan  Type of Anesthesia, risks & benefits discussed:    Anesthesia Type: Spinal  Intra-op Monitoring Plan: Standard ASA Monitors  Post Op Pain Control Plan: intrathecal opioid  Informed Consent: Informed consent signed with the Patient and all parties understand the risks and agree with anesthesia plan.  All questions answered. Patient consented to blood products? Yes  ASA Score: 2  Day of Surgery Review of History & Physical: H&P Update referred to the surgeon/provider.I have interviewed and examined the patient. I have reviewed the patient's H&P dated: There are no significant changes.     Ready For Surgery From Anesthesia Perspective.     .

## 2025-04-02 NOTE — PROGRESS NOTES
19 y.o. female  at 39w1d   She c/o leaking fluid since 0800 with increasing pain with contractions  Reports good fetal movement or fluttering. Denies any vaginal bleeding, leakage of fluid, cramping, contractions, or pressure.   Total weight gain/weight loss in pregnancy: 3.629 kg (8 lb)     Vitals  BP: (!) 114/52  Pulse: 110  Weight: 73.5 kg (162 lb)  Prenatal  Fundal Height (cm): 37 cm  Fetal Heart Rate: 143  Movement: Present  Edema  LLE Edema: None  RLE Edema: None  Facial: None  Additional Edema?: No  Cervical Exam  Dilation: 1  Effacement (%): 70  Station: -2  Presentation: Vertex  Station (Labor Curve): 7 cm  Dilation/Effacement/Station  Dilation: 1  Effacement (%): 70  Station: -2    Prenatal Labs:  Lab Results   Component Value Date    GROUPTRH O POS 09/10/2024    HGB 12.1 2025    HCT 37.1 (L) 2025     2025    SICKLE Negative 2024    HEPBSAG Non-Reactive 2024    [id number] Non-Reactive 2024    LABNGO Invalid 2025    LABURIN >100,000 Escherichia coli (A) 2024       A: 39w1d           ICD-10-CM ICD-9-CM    1. 39 weeks gestation of pregnancy  Z3A.39 V22.2 POCT Urinalysis      2. Group beta Strep positive  B95.1 041.02           P: Bleeding, daily fetal kick counts, and  labor/labor precautions discussed.    No pregnancy checklist tasks were completed during this visit, and no tasks are pending completion.      Questions answered to desired level of satisfaction  Verbalized understanding to all information and instructions provided.  Follow up in about 2 weeks (around 2025), or if symptoms worsen or fail to improve, for post op exam.    Mavis Head DNP, CNM, WHNP-BC

## 2025-04-02 NOTE — H&P (VIEW-ONLY)
History and Physical  Obstetrics      SUBJECTIVE:     Arnaud Henning is a 19 y.o.  female with an Estimated Date of Delivery: 25 admitted for augmentation of labor.  Her current obstetrical history is significant for Group beta strep positive.  She reports backache and contractions since yesterday and SROM with leakage of fluid since 0800 this morning . Fetal Movement: normal.    (Not in a hospital admission)      Review of patient's allergies indicates:  No Known Allergies     Past Medical History:   Diagnosis Date    Exposure to sexually transmitted disease (STD) 2022    GC, CT, and trich     History reviewed. No pertinent surgical history.  No family history on file.  Social History[1]     OBJECTIVE:     Vital Signs (Most Recent)  Pulse: 110 (25 1310)  BP: (!) 114/52 (25 1310)    Vitals  BP: (!) 114/52  Pulse: 110  Weight: 73.5 kg (162 lb)  Prenatal  Fundal Height (cm): 37 cm  Fetal Heart Rate: 143  Movement: Present  Edema  LLE Edema: None  RLE Edema: None  Facial: None  Additional Edema?: No  Cervical Exam  Dilation: 1  Effacement (%): 70  Station: -2  Presentation: Vertex  Station (Labor Curve): 7 cm  Dilation/Effacement/Station  Dilation: 1  Effacement (%): 70  Station: -2    Physical Exam:  General:  alert, normal appearing gravid female, and cooperative   Skin:  Skin color, texture, turgor normal. No rashes or lesions   HEENT:  neck supple with midline trachea, thyroid without masses, and trachea midline   Lungs:  clear to auscultation bilaterally   Heart:  regular rate and rhythm, S1, S2 normal, no murmur, click, rub or gallop   Breasts:  no discharge, erythema, or tenderness   Abdomen:  soft, non-tender; bowel sounds normal   Uterine Size:   37 cm   Presentations:  cephalic   FHT:  143 BPM   Pelvis: adequate   Cervix:     Dilation: 1cm    Effacement: 70%    Station:  -2    Consistency: medium    Position: middle     Laboratory:  Lab Results   Component Value Date    GROUPTRH O  POS 09/10/2024    HGB 12.1 2025    HCT 37.1 (L) 2025     2025    SICKLE Negative 2024    HEPBSAG Non-Reactive 2024    [id number] Non-Reactive 2024    LABNGO Invalid 2025    LABURIN >100,000 Escherichia coli (A) 2024       ASSESSMENT/PLAN:     39w1d gestation.  Early latent labor. and Spontaneous rupture of membranes   Conditions: GBBS Colonization     Risks, benefits, alternatives and possible complications have been discussed in detail with the patient.  Pre-admission, admission, and post admission procedures and expectations were discussed in detail.  All questions answered to desired level of satisfaction. Admission is planned for today.   Obtain admission labs  Pitocin augmentation of labor  May have analgesics if desired  May have epidural if desired  Reposition for comfort  Verbalized understanding to all instructions and information  Questions answered to desired level of satisfaction                 [1]   Social History  Tobacco Use    Smoking status: Every Day     Types: Vaping with nicotine, Vaping w/o nicotine     Passive exposure: Never    Smokeless tobacco: Never   Substance Use Topics    Alcohol use: Not Currently    Drug use: Yes     Frequency: 2.0 times per week     Types: Marijuana     Comment: before meal

## 2025-04-02 NOTE — TRANSFER OF CARE
Anesthesia Transfer of Care Note    Patient: Arnaud Henning    Procedure(s) Performed: Procedure(s) (LRB):   SECTION (N/A)    Patient location: Labor and Delivery    Anesthesia Type: spinal    Transport from OR: Transported from OR on room air with adequate spontaneous ventilation    Post pain: adequate analgesia    Post assessment: no apparent anesthetic complications    Post vital signs: stable    Level of consciousness: awake, alert and oriented    Nausea/Vomiting: no nausea/vomiting    Complications: none    Transfer of care protocol was followedComments: Transported to Pt room, connected to monitors, NAD noted, VSS, PT voices comfort.      Last vitals: Visit Vitals  /72   Pulse (!) 112   Ht 5' (1.524 m)   Wt 74.9 kg (165 lb 1.6 oz)   LMP 2024 (Exact Date)   Breastfeeding No   BMI 32.24 kg/m²

## 2025-04-02 NOTE — PROGRESS NOTES
Patient experienced non reassuring FHTs with recurrent late decelerations and noted at category II tracing at 1 cm dilated. Dr. Nair agreed to proceed with primary  section secondary to non reassuring FHR tracing. Pt informed of risks and benefits- with questions answered to desired level of satisfaction.

## 2025-04-02 NOTE — PLAN OF CARE
Problem: Adult Inpatient Plan of Care  Goal: Plan of Care Review  Outcome: Progressing  Goal: Patient-Specific Goal (Individualized)  Outcome: Progressing  Goal: Absence of Hospital-Acquired Illness or Injury  Outcome: Progressing  Goal: Optimal Comfort and Wellbeing  Outcome: Progressing  Goal: Readiness for Transition of Care  Outcome: Progressing     Problem: Labor  Goal: Hemostasis  Outcome: Progressing  Goal: Stable Fetal Wellbeing  Outcome: Progressing  Goal: Effective Progression to Delivery  Outcome: Progressing  Goal: Absence of Infection Signs and Symptoms  Outcome: Progressing  Goal: Acceptable Pain Control  Outcome: Progressing  Goal: Normal Uterine Contraction Pattern  Outcome: Progressing

## 2025-04-02 NOTE — H&P
History and Physical  Obstetrics      SUBJECTIVE:     Arnaud Henning is a 19 y.o.  female with an Estimated Date of Delivery: 25 admitted for augmentation of labor.  Her current obstetrical history is significant for Group beta strep positive.  She reports backache and contractions since yesterday and SROM with leakage of fluid since 0800 this morning . Fetal Movement: normal.    (Not in a hospital admission)      Review of patient's allergies indicates:  No Known Allergies     Past Medical History:   Diagnosis Date    Exposure to sexually transmitted disease (STD) 2022    GC, CT, and trich     History reviewed. No pertinent surgical history.  No family history on file.  Social History[1]     OBJECTIVE:     Vital Signs (Most Recent)  Pulse: 110 (25 1310)  BP: (!) 114/52 (25 1310)    Vitals  BP: (!) 114/52  Pulse: 110  Weight: 73.5 kg (162 lb)  Prenatal  Fundal Height (cm): 37 cm  Fetal Heart Rate: 143  Movement: Present  Edema  LLE Edema: None  RLE Edema: None  Facial: None  Additional Edema?: No  Cervical Exam  Dilation: 1  Effacement (%): 70  Station: -2  Presentation: Vertex  Station (Labor Curve): 7 cm  Dilation/Effacement/Station  Dilation: 1  Effacement (%): 70  Station: -2    Physical Exam:  General:  alert, normal appearing gravid female, and cooperative   Skin:  Skin color, texture, turgor normal. No rashes or lesions   HEENT:  neck supple with midline trachea, thyroid without masses, and trachea midline   Lungs:  clear to auscultation bilaterally   Heart:  regular rate and rhythm, S1, S2 normal, no murmur, click, rub or gallop   Breasts:  no discharge, erythema, or tenderness   Abdomen:  soft, non-tender; bowel sounds normal   Uterine Size:   37 cm   Presentations:  cephalic   FHT:  143 BPM   Pelvis: adequate   Cervix:     Dilation: 1cm    Effacement: 70%    Station:  -2    Consistency: medium    Position: middle     Laboratory:  Lab Results   Component Value Date    GROUPTRH O  POS 09/10/2024    HGB 12.1 2025    HCT 37.1 (L) 2025     2025    SICKLE Negative 2024    HEPBSAG Non-Reactive 2024    [id number] Non-Reactive 2024    LABNGO Invalid 2025    LABURIN >100,000 Escherichia coli (A) 2024       ASSESSMENT/PLAN:     39w1d gestation.  Early latent labor. and Spontaneous rupture of membranes   Conditions: GBBS Colonization     Risks, benefits, alternatives and possible complications have been discussed in detail with the patient.  Pre-admission, admission, and post admission procedures and expectations were discussed in detail.  All questions answered to desired level of satisfaction. Admission is planned for today.   Obtain admission labs  Pitocin augmentation of labor  May have analgesics if desired  May have epidural if desired  Reposition for comfort  Verbalized understanding to all instructions and information  Questions answered to desired level of satisfaction                 [1]   Social History  Tobacco Use    Smoking status: Every Day     Types: Vaping with nicotine, Vaping w/o nicotine     Passive exposure: Never    Smokeless tobacco: Never   Substance Use Topics    Alcohol use: Not Currently    Drug use: Yes     Frequency: 2.0 times per week     Types: Marijuana     Comment: before meal

## 2025-04-02 NOTE — PLAN OF CARE
Problem: Postpartum ( Delivery)  Goal: Successful Parent Role Transition  Outcome: Progressing  Goal: Hemostasis  Outcome: Progressing  Goal: Effective Bowel Elimination  Outcome: Progressing  Goal: Fluid and Electrolyte Balance  Outcome: Progressing  Goal: Absence of Infection Signs and Symptoms  Outcome: Progressing  Goal: Anesthesia/Sedation Recovery  Outcome: Progressing  Goal: Optimal Pain Control and Function  Outcome: Progressing  Goal: Nausea and Vomiting Relief  Outcome: Progressing  Goal: Effective Urinary Elimination  Outcome: Progressing  Goal: Effective Oxygenation and Ventilation  Outcome: Progressing     Problem: Postpartum ( Delivery)  Goal: Successful Parent Role Transition  Outcome: Progressing  Goal: Hemostasis  Outcome: Progressing  Goal: Effective Bowel Elimination  Outcome: Progressing  Goal: Fluid and Electrolyte Balance  Outcome: Progressing  Goal: Absence of Infection Signs and Symptoms  Outcome: Progressing  Goal: Anesthesia/Sedation Recovery  Outcome: Progressing  Goal: Optimal Pain Control and Function  Outcome: Progressing  Goal: Nausea and Vomiting Relief  Outcome: Progressing  Goal: Effective Urinary Elimination  Outcome: Progressing  Goal: Effective Oxygenation and Ventilation  Outcome: Progressing

## 2025-04-02 NOTE — LETTER
April 4, 2025         [address] [place] [address]  [place] [address]  Phone: [phone]  Fax: [phone]       Date of Visit: 04/02/2025    To Whom It May Concern:    Please be advised that under state and federal laws as it relates to patient privacy and Health Insurance Accountability Act (HIPAA), we can not release our patient(s) name without authorization. Although, we can confirm that the individual listed below did accompany a person to our facility for healthcare services to be provided.    This document confirms that Dewey Benítez accompanied a patient to our facility on 04/02/25 and can return back to work 04/04/25.    Sincerely,     Vianney Greer RN

## 2025-04-02 NOTE — L&D DELIVERY NOTE
[facility] -  Labor and Delivery   Section   Operative Note    SUMMARY     Date of Procedure: 2025     Procedure: Procedure(s) (LRB):   SECTION (N/A)    Surgeons and Role:     * Caleb Toney MD - Primary    Assisting Surgeon: None    Pre-Operative Diagnosis: Fetal Intolerance of Labor  39 weeks  Post-Operative Diagnosis: Post-Op Diagnosis Codes:     * Pregnant [Z34.90]  same  Anesthesia: Spinal/Epidural    Technical Procedures Used: primary LTCS           Description of the Findings of the Procedure: liveborn male     Significant Surgical Tasks Conducted by the Assistant(s), if Applicable: none    Complications: No    Blood Loss: 511 ml     With patient in supine position, the legs are  and Tavera Catheter placed and positioning to supine done.   Abdomen prepped with Chloroprep and 3 minute drying time allowed prior to draping of the abdomen.   Time out taken with OR team members.Following informed consent the patient was taken to the operating room where spinal anesthesia was administered without difficulty. She was prepped and draped in the usual sterile fashion and placed in the dorsal lithotomy position. A Pfannenstiel skin incision was made with the scalpel and the underlying layer of fascia, entered with the Bovie. The fascial incision was extended laterally. The inferior and superior aspects of the fascial incision were grasped with [place] clamps, elevated and the rectus muscles dissected off bluntly. The rectus muscles were then  in the midline and the peritoneum identified and entered with Metzenbaum scissors. This was then extended superiorly and inferiorly with good visualization of the bladder. The bladder blade was inserted and the vesicouterine peritoneum identified and entered with the Metzenbaum scissors. This incision extended superiorly and inferiorly and a bladder flap created digitally. The bladder blade was then reinserted and the  uterus incised in a transverse fashion with the scalpel. This was extended laterally. The baby's head was delivered atraumatically. The nose and mouth were bulb suctioned. The cord was clamped and cut infant the infant was handed off to the waiting nurses. Cord blood was sent. Placenta was then removed manually and the uterus cleared of all clots and debris  The uterine incision was repaired with #1 Vicryl in a running locked fashion. A second layer of the same suture was used to obtain hemostasis. The gutters when cleared of all clots and debris and once again hemostasis found be satisfactory.  All instruments were then removed from the abdomen. The fascial incision per 0 Vicryl in a running fashion. Skin was closed with interrupted staples. At the end procedure all sponge lap needle sponge counts correct ×2. Patient taken cover awake and stable condition.        Specimens:   Specimen (24h ago, onward)      None            Condition: Good    VTE Risk Mitigation (From admission, onward)           Ordered     IP VTE LOW RISK PATIENT  Once         25 1441     Place CAMI hose  Until discontinued         25 1441                    Disposition: PACU - hemodynamically stable.    Attestation: Good         Delivery Information for Jean Claude Henning    Birth information:  YOB: 2026   Time of birth: 4:42 PM   Sex: male   Head Delivery Date/Time:     Delivery type:    Gestational Age: 39w1d       Delivery Providers    Delivering clinician:            Measurements    Weight: 2350 g  Weight (lbs): 5 lb 2.9 oz  Length:          Apgars    Living status: Living  Apgar Component Scores:  1 min.:  5 min.:  10 min.:  15 min.:  20 min.:    Skin color:  0  0       Heart rate:  2  2       Reflex irritability:  2  2       Muscle tone:  2  2       Respiratory effort:  1  2       Total:  7  8       Apgars assigned by: STEWART NNAMARA                         Interventions/Resuscitation           Cord    No data filed        Placenta    Placenta delivery date/time: 2025 16:43  Placenta removal: Manual removal  Placenta appearance: Intact  Placenta disposition: Discarded           Labor Events:       labor:       Labor Onset Date/Time:         Dilation Complete Date/Time:         Start Pushing Date/Time:       Rupture Date/Time:            Rupture type:          Fluid Amount:       Fluid Color:        steroids:       Antibiotics given for GBS:       Induction:       Indications for induction:        Augmentation:       Indications for augmentation:       Labor complications:       Additional complications:          Cervical ripening:                     Delivery:      Episiotomy:       Indication for Episiotomy:       Perineal Lacerations:   Repaired:      Periurethral Laceration:   Repaired:     Labial Laceration:   Repaired:     Sulcus Laceration:   Repaired:     Vaginal Laceration:   Repaired:     Cervical Laceration:   Repaired:     Repair suture:       Repair # of packets:       Last Value - EBL - Nursing (mL):       Sum - EBL - Nursing (mL): 0     Last Value - EBL - Anesthesia (mL):      Calculated QBL (mL): 511     Running total QBL (mL): 511     Vaginal Sweep Performed:       Surgicount Correct:         Other providers:            Details (if applicable):  Trial of Labor      Categorization:      Priority:     Indications for :     Incision Type:       Additional  information:  Forceps:    Vacuum:    Breech:    Observed anomalies    Other (Comments):

## 2025-04-03 PROBLEM — Z98.891 STATUS POST CESAREAN SECTION: Status: ACTIVE | Noted: 2025-04-03

## 2025-04-03 PROBLEM — F17.200 TOBACCO DEPENDENCY: Status: ACTIVE | Noted: 2025-04-03

## 2025-04-03 LAB
BASOPHILS # BLD AUTO: 0.05 K/UL (ref 0–0.2)
BASOPHILS NFR BLD AUTO: 0.2 % (ref 0–1)
DIFFERENTIAL METHOD BLD: ABNORMAL
EOSINOPHIL # BLD AUTO: 0.01 K/UL (ref 0–0.5)
EOSINOPHIL NFR BLD AUTO: 0 % (ref 1–4)
ERYTHROCYTE [DISTWIDTH] IN BLOOD BY AUTOMATED COUNT: 13.1 % (ref 11.5–14.5)
HCT VFR BLD AUTO: 35.8 % (ref 38–47)
HGB BLD-MCNC: 12 G/DL (ref 12–16)
IMM GRANULOCYTES # BLD AUTO: 0.14 K/UL (ref 0–0.04)
IMM GRANULOCYTES NFR BLD: 0.5 % (ref 0–0.4)
LYMPHOCYTES # BLD AUTO: 1.66 K/UL (ref 1–4.8)
LYMPHOCYTES NFR BLD AUTO: 6.3 % (ref 27–41)
LYMPHOCYTES NFR BLD MANUAL: 8 % (ref 27–41)
MCH RBC QN AUTO: 30 PG (ref 27–31)
MCHC RBC AUTO-ENTMCNC: 33.5 G/DL (ref 32–36)
MCV RBC AUTO: 89.5 FL (ref 80–96)
MONOCYTES # BLD AUTO: 1.03 K/UL (ref 0–0.8)
MONOCYTES NFR BLD AUTO: 3.9 % (ref 2–6)
MONOCYTES NFR BLD MANUAL: 5 % (ref 2–6)
MPC BLD CALC-MCNC: 10.4 FL (ref 9.4–12.4)
NEUTROPHILS # BLD AUTO: 23.5 K/UL (ref 1.8–7.7)
NEUTROPHILS NFR BLD AUTO: 89.1 % (ref 53–65)
NEUTS BAND NFR BLD MANUAL: 7 % (ref 1–5)
NEUTS SEG NFR BLD MANUAL: 80 % (ref 50–62)
NRBC # BLD AUTO: 0 X10E3/UL
NRBC, AUTO (.00): 0 %
PLATELET # BLD AUTO: 229 K/UL (ref 150–400)
PLATELET MORPHOLOGY: NORMAL
RBC # BLD AUTO: 4 M/UL (ref 4.2–5.4)
RBC MORPH BLD: NORMAL
WBC # BLD AUTO: 26.39 K/UL (ref 4.5–11)

## 2025-04-03 PROCEDURE — 36415 COLL VENOUS BLD VENIPUNCTURE: CPT | Performed by: OBSTETRICS & GYNECOLOGY

## 2025-04-03 PROCEDURE — 11000001 HC ACUTE MED/SURG PRIVATE ROOM

## 2025-04-03 PROCEDURE — 63600175 PHARM REV CODE 636 W HCPCS: Performed by: OBSTETRICS & GYNECOLOGY

## 2025-04-03 PROCEDURE — 25000003 PHARM REV CODE 250: Performed by: OBSTETRICS & GYNECOLOGY

## 2025-04-03 PROCEDURE — 63600175 PHARM REV CODE 636 W HCPCS: Mod: JZ,TB | Performed by: ANESTHESIOLOGY

## 2025-04-03 PROCEDURE — 85025 COMPLETE CBC W/AUTO DIFF WBC: CPT | Performed by: OBSTETRICS & GYNECOLOGY

## 2025-04-03 PROCEDURE — 25000003 PHARM REV CODE 250: Performed by: ANESTHESIOLOGY

## 2025-04-03 RX ORDER — ACETAMINOPHEN 325 MG/1
650 TABLET ORAL EVERY 6 HOURS PRN
Status: DISCONTINUED | OUTPATIENT
Start: 2025-04-03 | End: 2025-04-05 | Stop reason: HOSPADM

## 2025-04-03 RX ORDER — OXYCODONE AND ACETAMINOPHEN 10; 325 MG/1; MG/1
1 TABLET ORAL EVERY 4 HOURS PRN
Refills: 0 | Status: DISCONTINUED | OUTPATIENT
Start: 2025-04-03 | End: 2025-04-05 | Stop reason: HOSPADM

## 2025-04-03 RX ORDER — IBUPROFEN 200 MG
1 TABLET ORAL DAILY
Status: DISCONTINUED | OUTPATIENT
Start: 2025-04-03 | End: 2025-04-05 | Stop reason: HOSPADM

## 2025-04-03 RX ORDER — CEFAZOLIN 2 G/1
2 INJECTION, POWDER, FOR SOLUTION INTRAMUSCULAR; INTRAVENOUS
Status: COMPLETED | OUTPATIENT
Start: 2025-04-03 | End: 2025-04-03

## 2025-04-03 RX ADMIN — OXYCODONE AND ACETAMINOPHEN 1 TABLET: 10; 325 TABLET ORAL at 09:04

## 2025-04-03 RX ADMIN — Medication 1 TABLET: at 08:04

## 2025-04-03 RX ADMIN — CEFAZOLIN 2 G: 2 INJECTION, POWDER, FOR SOLUTION INTRAMUSCULAR; INTRAVENOUS at 08:04

## 2025-04-03 RX ADMIN — DOCUSATE SODIUM 200 MG: 100 CAPSULE, LIQUID FILLED ORAL at 11:04

## 2025-04-03 RX ADMIN — IBUPROFEN 800 MG: 800 TABLET, FILM COATED ORAL at 11:04

## 2025-04-03 RX ADMIN — IBUPROFEN 800 MG: 800 TABLET, FILM COATED ORAL at 01:04

## 2025-04-03 RX ADMIN — OXYCODONE AND ACETAMINOPHEN 1 TABLET: 10; 325 TABLET ORAL at 01:04

## 2025-04-03 RX ADMIN — OXYCODONE 10 MG: 5 TABLET ORAL at 05:04

## 2025-04-03 RX ADMIN — OXYCODONE AND ACETAMINOPHEN 1 TABLET: 10; 325 TABLET ORAL at 11:04

## 2025-04-03 RX ADMIN — KETOROLAC TROMETHAMINE 30 MG: 30 INJECTION, SOLUTION INTRAMUSCULAR at 05:04

## 2025-04-03 RX ADMIN — DOCUSATE SODIUM 200 MG: 100 CAPSULE, LIQUID FILLED ORAL at 08:04

## 2025-04-03 RX ADMIN — MUPIROCIN: 20 OINTMENT TOPICAL at 08:04

## 2025-04-03 RX ADMIN — CEFAZOLIN 2 G: 2 INJECTION, POWDER, FOR SOLUTION INTRAMUSCULAR; INTRAVENOUS at 04:04

## 2025-04-03 RX ADMIN — OXYCODONE AND ACETAMINOPHEN 1 TABLET: 10; 325 TABLET ORAL at 07:04

## 2025-04-03 NOTE — HOSPITAL COURSE
Patient is a 39 week 1 day primigravida that presented from the clinic with SROM since 0800 am on 2025 and was 1 cm dilated. She experienced non reassuring FHTs and had a primary low transverse  section secondary to fetal intolerance to labor. She delivered a male  with Apgars 7 and 9 weighing 5 pounds 2.9 ounces. She is bottle feeding.

## 2025-04-03 NOTE — LACTATION NOTE
This note was copied from a baby's chart.  Moved to nicu bed 8, placed on monitor, maribel Warren at bedside.

## 2025-04-03 NOTE — SUBJECTIVE & OBJECTIVE
Interval History: Post Op Day 1    She is doing well this morning. She is tolerating a liquids without nausea or vomiting. She has a rinaldi catheter that is draining clear yellow urine. She is not ambulating. She has not passed flatus, and has not a BM. Vaginal bleeding is mild. She denies fever or chills. Abdominal pain is mild and controlled with oral medications. She Is not breastfeeding. She desires circumcision for her male baby: yes.    Objective:     Vital Signs (Most Recent):  Temp: 97.5 °F (36.4 °C) (04/02/25 1434)  Pulse: 95 (04/02/25 2100)  Resp: 18 (04/03/25 0526)  BP: 116/74 (04/03/25 0200)  SpO2: 98 % (04/02/25 2000) Vital Signs (24h Range):  Temp:  [97.5 °F (36.4 °C)] 97.5 °F (36.4 °C)  Pulse:  [] 95  Resp:  [17-18] 18  SpO2:  [86 %-100 %] 98 %  BP: ()/(52-85) 116/74     Weight: 74.9 kg (165 lb 1.6 oz)  Body mass index is 32.24 kg/m².      Intake/Output Summary (Last 24 hours) at 4/3/2025 0632  Last data filed at 4/3/2025 0506  Gross per 24 hour   Intake --   Output 3261 ml   Net -3261 ml         Significant Labs:  Lab Results   Component Value Date    GROUPTRH O POS 04/02/2025    HEPBSAG Non-Reactive 04/02/2025     Recent Labs   Lab 04/03/25  0448   HGB 12.0   HCT 35.8*       CBC:   Recent Labs   Lab 04/03/25  0448   WBC 26.39*   RBC 4.00*   HGB 12.0   HCT 35.8*      MCV 89.5   MCH 30.0   MCHC 33.5     I have personallly reviewed all pertinent lab results from the last 24 hours.    Physical Exam:   Constitutional: She is oriented to person, place, and time. She appears well-developed and well-nourished.        Pulmonary/Chest: Effort normal.        Abdominal: Soft.   Incision- dressing removed with a small amount of drainage noted. Steri strips intact. No redness, edema, ecchymosis noted     Genitourinary:    Genitourinary Comments: Lochia: small rubra noted  Uterus: firm, midline 1 below umbilicus             Musculoskeletal: Moves all extremeties.       Neurological: She is alert  and oriented to person, place, and time.    Skin: Skin is warm and dry.    Psychiatric: She has a normal mood and affect. Her behavior is normal. Judgment and thought content normal.       Review of Systems   All other systems reviewed and are negative.

## 2025-04-03 NOTE — PROGRESS NOTES
[facility] -  Labor and Delivery  Obstetrics  Postpartum Progress Note    Patient Name: Arnaud Henning  MRN: [id number]  Admission Date: 2025  Hospital Length of Stay: 1 days  Attending Physician: Caleb Toney MD  Primary Care Provider: Merary, Primary Doctor    Subjective:     Principal Problem:Status post  section    Hospital Course:  Patient is a 39 week 1 day primigravida that presented from the clinic with SROM since 0800 am on 2025 and was 1 cm dilated. She experienced non reassuring FHTs and had a primary low transverse  section secondary to fetal intolerance to labor. She delivered a male  with Apgars 7 and 9 weighing 5 pounds 2.9 ounces. She is bottle feeding.     Interval History: Post Op Day 1    She is doing well this morning. She is tolerating a liquids without nausea or vomiting. She has a rinaldi catheter that is draining clear yellow urine. She is not ambulating. She has not passed flatus, and has not a BM. Vaginal bleeding is mild. She denies fever or chills. Abdominal pain is mild and controlled with oral medications. She Is not breastfeeding. She desires circumcision for her male baby: yes.    Objective:     Vital Signs (Most Recent):  Temp: 97.5 °F (36.4 °C) (25 1434)  Pulse: 95 (25 2100)  Resp: 18 (25 0526)  BP: 116/74 (25 0200)  SpO2: 98 % (25 2000) Vital Signs (24h Range):  Temp:  [97.5 °F (36.4 °C)] 97.5 °F (36.4 °C)  Pulse:  [] 95  Resp:  [17-18] 18  SpO2:  [86 %-100 %] 98 %  BP: ()/(52-85) 116/74     Weight: 74.9 kg (165 lb 1.6 oz)  Body mass index is 32.24 kg/m².      Intake/Output Summary (Last 24 hours) at 4/3/2025 0632  Last data filed at 4/3/2025 0506  Gross per 24 hour   Intake --   Output 3261 ml   Net -3261 ml         Significant Labs:  Lab Results   Component Value Date    GROUPTRH O POS 2025    HEPBSAG Non-Reactive 2025     Recent Labs   Lab 25  0448   HGB 12.0   HCT 35.8*        CBC:   Recent Labs   Lab 25  0448   WBC 26.39*   RBC 4.00*   HGB 12.0   HCT 35.8*      MCV 89.5   MCH 30.0   MCHC 33.5     I have personallly reviewed all pertinent lab results from the last 24 hours.    Physical Exam:   Constitutional: She is oriented to person, place, and time. She appears well-developed and well-nourished.        Pulmonary/Chest: Effort normal.        Abdominal: Soft.   Incision- dressing removed with a small amount of drainage noted. Steri strips intact. No redness, edema, ecchymosis noted     Genitourinary:    Genitourinary Comments: Lochia: small rubra noted  Uterus: firm, midline 1 below umbilicus             Musculoskeletal: Moves all extremeties.       Neurological: She is alert and oriented to person, place, and time.    Skin: Skin is warm and dry.    Psychiatric: She has a normal mood and affect. Her behavior is normal. Judgment and thought content normal.       Review of Systems   All other systems reviewed and are negative.    Assessment/Plan:     19 y.o. female  for:    * Status post  section  Routine postpartum care  Increase ambulation        Disposition: As patient meets milestones, will plan to discharge in 1-2 days.    Mavis Head CNM  Obstetrics  [facility] -  Labor and Delivery

## 2025-04-03 NOTE — LACTATION NOTE
This note was copied from a baby's chart.  Breast pump set up at moms bedside, instructed on use, labeling, and cleaning pump part, mom encouraged to start pumping asap, and to pump 8 or more times in 24 hours for 20 minutes. Instructed on hand hygiene

## 2025-04-04 LAB
BASOPHILS # BLD AUTO: 0.03 K/UL (ref 0–0.2)
BASOPHILS NFR BLD AUTO: 0.2 % (ref 0–1)
DIFFERENTIAL METHOD BLD: ABNORMAL
EOSINOPHIL # BLD AUTO: 0.17 K/UL (ref 0–0.5)
EOSINOPHIL NFR BLD AUTO: 0.9 % (ref 1–4)
ERYTHROCYTE [DISTWIDTH] IN BLOOD BY AUTOMATED COUNT: 13.1 % (ref 11.5–14.5)
HCT VFR BLD AUTO: 32.6 % (ref 38–47)
HGB BLD-MCNC: 10.6 G/DL (ref 12–16)
IMM GRANULOCYTES # BLD AUTO: 0.11 K/UL (ref 0–0.04)
IMM GRANULOCYTES NFR BLD: 0.6 % (ref 0–0.4)
LYMPHOCYTES # BLD AUTO: 1.81 K/UL (ref 1–4.8)
LYMPHOCYTES NFR BLD AUTO: 9.9 % (ref 27–41)
MCH RBC QN AUTO: 28.7 PG (ref 27–31)
MCHC RBC AUTO-ENTMCNC: 32.5 G/DL (ref 32–36)
MCV RBC AUTO: 88.3 FL (ref 80–96)
MONOCYTES # BLD AUTO: 1.09 K/UL (ref 0–0.8)
MONOCYTES NFR BLD AUTO: 6 % (ref 2–6)
MPC BLD CALC-MCNC: 10.6 FL (ref 9.4–12.4)
NEUTROPHILS # BLD AUTO: 15.1 K/UL (ref 1.8–7.7)
NEUTROPHILS NFR BLD AUTO: 82.4 % (ref 53–65)
NRBC # BLD AUTO: 0 X10E3/UL
NRBC, AUTO (.00): 0 %
PLATELET # BLD AUTO: 207 K/UL (ref 150–400)
RBC # BLD AUTO: 3.69 M/UL (ref 4.2–5.4)
WBC # BLD AUTO: 18.31 K/UL (ref 4.5–11)

## 2025-04-04 PROCEDURE — 11000001 HC ACUTE MED/SURG PRIVATE ROOM

## 2025-04-04 PROCEDURE — 62322 NJX INTERLAMINAR LMBR/SAC: CPT | Performed by: ANESTHESIOLOGY

## 2025-04-04 PROCEDURE — S4991 NICOTINE PATCH NONLEGEND: HCPCS | Performed by: OBSTETRICS & GYNECOLOGY

## 2025-04-04 PROCEDURE — 36415 COLL VENOUS BLD VENIPUNCTURE: CPT | Performed by: ADVANCED PRACTICE MIDWIFE

## 2025-04-04 PROCEDURE — 25000003 PHARM REV CODE 250: Performed by: OBSTETRICS & GYNECOLOGY

## 2025-04-04 PROCEDURE — 85025 COMPLETE CBC W/AUTO DIFF WBC: CPT | Performed by: ADVANCED PRACTICE MIDWIFE

## 2025-04-04 RX ADMIN — OXYCODONE AND ACETAMINOPHEN 1 TABLET: 10; 325 TABLET ORAL at 06:04

## 2025-04-04 RX ADMIN — DOCUSATE SODIUM 200 MG: 100 CAPSULE, LIQUID FILLED ORAL at 08:04

## 2025-04-04 RX ADMIN — IBUPROFEN 800 MG: 800 TABLET, FILM COATED ORAL at 08:04

## 2025-04-04 RX ADMIN — Medication 1 TABLET: at 08:04

## 2025-04-04 RX ADMIN — MUPIROCIN: 20 OINTMENT TOPICAL at 08:04

## 2025-04-04 RX ADMIN — IBUPROFEN 800 MG: 800 TABLET, FILM COATED ORAL at 05:04

## 2025-04-04 RX ADMIN — OXYCODONE AND ACETAMINOPHEN 1 TABLET: 10; 325 TABLET ORAL at 04:04

## 2025-04-04 RX ADMIN — NICOTINE 1 PATCH: 14 PATCH, EXTENDED RELEASE TRANSDERMAL at 09:04

## 2025-04-04 NOTE — ANESTHESIA POSTPROCEDURE EVALUATION
Anesthesia Post Evaluation    Patient: Arnaud Henning    Procedure(s) Performed: Procedure(s) (LRB):   SECTION (N/A)    Final Anesthesia Type: spinal      Patient location during evaluation: labor & delivery  Patient participation: Yes- Able to Participate  Level of consciousness: awake and alert  Post-procedure vital signs: reviewed and stable  Pain management: adequate  Airway patency: patent    PONV status at discharge: No PONV  Anesthetic complications: no      Cardiovascular status: blood pressure returned to baseline  Respiratory status: unassisted  Hydration status: euvolemic  Follow-up not needed.              Vitals Value Taken Time   /75 25 17:04   Temp 36.8 °C (98.2 °F) 25 15:19   Pulse 83 25 17:04   Resp 16 25 16:04   SpO2 98 % 25 15:19   Vitals shown include unfiled device data.      Event Time   Out of Recovery 19:20:00         Pain/Vimal Score: Pain Rating Prior to Med Admin: 8 (2025  5:13 PM)  Pain Rating Post Med Admin: 9 (comfort measures given,got pt up to ambulate in case of trapped gas) (2025  9:59 AM)

## 2025-04-04 NOTE — PLAN OF CARE
Problem: Postpartum ( Delivery)  Goal: Successful Parent Role Transition  2025 by Vianney Greer RN  Outcome: Progressing  2025 by Vianney Greer RN  Outcome: Progressing  Goal: Hemostasis  2025 by Vianney Greer, RN  Outcome: Progressing  2025 by Vianney Greer, RN  Outcome: Progressing  Goal: Effective Bowel Elimination  2025 by Vianney Greer RN  Outcome: Progressing  2025 by Vianney Greer RN  Outcome: Progressing  Goal: Fluid and Electrolyte Balance  2025 by Vianney Greer RN  Outcome: Progressing  2025 by Vianney Greer RN  Outcome: Progressing  Goal: Absence of Infection Signs and Symptoms  2025 by Vianney Greer, RN  Outcome: Progressing  2025 by Vianney Greer RN  Outcome: Progressing  Goal: Anesthesia/Sedation Recovery  2025 by Vianney Greer RN  Outcome: Progressing  2025 by Vianney Greer RN  Outcome: Progressing  Goal: Optimal Pain Control and Function  2025 by Vianney Greer RN  Outcome: Progressing  2025 by Vianney Greer, RN  Outcome: Progressing  Goal: Nausea and Vomiting Relief  2025 by Vianney Greer, RN  Outcome: Progressing  2025 by Vianney Greer RN  Outcome: Progressing  Goal: Effective Urinary Elimination  2025 by Vianney Greer RN  Outcome: Progressing  2025 by Vianney Greer RN  Outcome: Progressing  Goal: Effective Oxygenation and Ventilation  2025 by Vianney Greer RN  Outcome: Progressing  2025 by Vianney Greer RN  Outcome: Progressing

## 2025-04-04 NOTE — PROGRESS NOTES
[facility] -  Labor and Delivery  Obstetrics  Postpartum Progress Note    Patient Name: Arnaud Henning  MRN: [id number]  Admission Date: 2025  Hospital Length of Stay: 2 days  Attending Physician: Caleb Toney MD  Primary Care Provider: Merary, Primary Doctor    Subjective:     Principal Problem:Status post  section    Hospital Course:  Patient is a 39 week 1 day primigravida that presented from the clinic with SROM since 0800 am on 2025 and was 1 cm dilated. She experienced non reassuring FHTs and had a primary low transverse  section secondary to fetal intolerance to labor. She delivered a male  with Apgars 7 and 9 weighing 5 pounds 2.9 ounces. She is bottle feeding.   States baby is in NICU and breast pumping is going well.     Interval History: Post Op Day 2    She is doing well this morning. She is tolerating a regular diet without nausea or vomiting. She is voiding spontaneously. She is ambulating. She has passed flatus, and has not a BM. Vaginal bleeding is mild. She denies fever or chills. Abdominal pain is mild and controlled with oral medications. She Is breast pumping. She desires circumcision for her male baby: yes.    Objective:     Vital Signs (Most Recent):  Temp: 98.4 °F (36.9 °C) (25)  Pulse: 81 (25)  Resp: 18 (25)  BP: 100/71 (25)  SpO2: 100 % (25 2309) Vital Signs (24h Range):  Temp:  [97.6 °F (36.4 °C)-98.4 °F (36.9 °C)] 98.4 °F (36.9 °C)  Pulse:  [] 81  Resp:  [17-20] 18  SpO2:  [93 %-100 %] 100 %  BP: ()/(63-78) 100/71     Weight: 74.9 kg (165 lb 1.6 oz)  Body mass index is 32.24 kg/m².    No intake or output data in the 24 hours ending 25 0903      Significant Labs:  Lab Results   Component Value Date    GROUPTRH O POS 2025    HEPBSAG Non-Reactive 2025     Recent Labs   Lab 25  0448   HGB 12.0   HCT 35.8*       CBC:   Recent Labs   Lab 25   WBC 26.39*    RBC 4.00*   HGB 12.0   HCT 35.8*      MCV 89.5   MCH 30.0   MCHC 33.5     I have personallly reviewed all pertinent lab results from the last 24 hours.  Recent Lab Results       None            Physical Exam:   Constitutional: She is oriented to person, place, and time. She appears well-developed and well-nourished.        Pulmonary/Chest: Effort normal.        Abdominal:   Incision dry, steri strips intact, no drainage, edema, ecchymosis noted     Genitourinary:    Genitourinary Comments: Uterus: two finger breaths below umbilicus  Lochia: small amount of rubra noted on perineal pad             Musculoskeletal: Moves all extremeties.       Neurological: She is alert and oriented to person, place, and time.    Skin: Skin is warm and dry.    Psychiatric: She has a normal mood and affect. Her behavior is normal. Judgment and thought content normal.       Review of Systems  Assessment/Plan:     19 y.o. female  for:    * Status post  section  Routine postpartum care  Increase ambulation  Repeat CBC today        Disposition: As patient meets milestones, will plan to discharge tomorrow if stable.    Mavis Head CNM  Obstetrics  [facility] -  Labor and Delivery

## 2025-04-04 NOTE — PLAN OF CARE
Problem: Postpartum ( Delivery)  Goal: Successful Parent Role Transition  Outcome: Progressing  Goal: Hemostasis  Outcome: Progressing  Goal: Effective Bowel Elimination  Outcome: Progressing  Goal: Fluid and Electrolyte Balance  Outcome: Progressing  Goal: Absence of Infection Signs and Symptoms  Outcome: Progressing  Goal: Anesthesia/Sedation Recovery  Outcome: Progressing   Preparing for discharge

## 2025-04-04 NOTE — ANESTHESIA PROCEDURE NOTES
Spinal    Diagnosis: c section  Patient location during procedure: OB  Start time: 4/2/2025 4:53 PM  Timeout: 4/2/2025 4:53 PM  End time: 4/2/2025 4:55 PM    Staffing  Authorizing Provider: Gerald White MD  Performing Provider: Gerald White MD    Staffing  Performed by: Gerald White MD  Authorized by: Gerald White MD    Preanesthetic Checklist  Completed: patient identified, IV checked, risks and benefits discussed, surgical consent, monitors and equipment checked, pre-op evaluation and timeout performed  Spinal Block  Patient position: sitting  Prep: Betadine  Patient monitoring: heart rate, continuous pulse ox, continuous capnometry and frequent blood pressure checks  Approach: midline  Location: L3-4  Injection technique: single shot  CSF Fluid: clear free-flowing CSF  Needle  Needle type: Quincke   Needle gauge: 22 G  Needle length: 4 in  Needle localization: anatomical landmarks  Assessment  Sensory level: T8   Dermatomal levels determined by alcohol wipe  Ease of block: easy  Patient's tolerance of the procedure: comfortable throughout block and no complaints

## 2025-04-04 NOTE — PLAN OF CARE
Problem: Postpartum ( Delivery)  Goal: Successful Parent Role Transition  2025 1325 by Monica Oconnor LPN  Outcome: Progressing  2025 0949 by Monica Oconnor LPN  Outcome: Progressing  Goal: Hemostasis  2025 132 by Monica Oconnor LPN  Outcome: Progressing  2025 0949 by Monica Oconnor LPN  Outcome: Progressing  Goal: Effective Bowel Elimination  2025 1325 by Monica Oconnor LPN  Outcome: Progressing  2025 0949 by Monica Oconnor LPN  Outcome: Progressing  Goal: Fluid and Electrolyte Balance  2025 132 by Monica Oconnor LPN  Outcome: Progressing  2025 0949 by Monica Oconnor LPN  Outcome: Progressing  Goal: Absence of Infection Signs and Symptoms  2025 132 by Monica Oconnor LPN  Outcome: Progressing  2025 0949 by Monica Oconnor LPN  Outcome: Progressing  Goal: Anesthesia/Sedation Recovery  2025 132 by Monica Oconnor LPN  Outcome: Progressing  2025 0949 by Monica Oconnor LPN  Outcome: Progressing  Goal: Optimal Pain Control and Function  Outcome: Progressing  Goal: Nausea and Vomiting Relief  Outcome: Progressing  Goal: Effective Urinary Elimination  Outcome: Progressing  Goal: Effective Oxygenation and Ventilation  Outcome: Progressing   Preparing for discharge

## 2025-04-04 NOTE — SUBJECTIVE & OBJECTIVE
Interval History: Post Op Day 2    She is doing well this morning. She is tolerating a regular diet without nausea or vomiting. She is voiding spontaneously. She is ambulating. She has passed flatus, and has not a BM. Vaginal bleeding is mild. She denies fever or chills. Abdominal pain is mild and controlled with oral medications. She Is breast pumping. She desires circumcision for her male baby: yes.    Objective:     Vital Signs (Most Recent):  Temp: 98.4 °F (36.9 °C) (04/04/25 0731)  Pulse: 81 (04/04/25 0731)  Resp: 18 (04/04/25 0731)  BP: 100/71 (04/04/25 0731)  SpO2: 100 % (04/03/25 2309) Vital Signs (24h Range):  Temp:  [97.6 °F (36.4 °C)-98.4 °F (36.9 °C)] 98.4 °F (36.9 °C)  Pulse:  [] 81  Resp:  [17-20] 18  SpO2:  [93 %-100 %] 100 %  BP: ()/(63-78) 100/71     Weight: 74.9 kg (165 lb 1.6 oz)  Body mass index is 32.24 kg/m².    No intake or output data in the 24 hours ending 04/04/25 0935      Significant Labs:  Lab Results   Component Value Date    GROUPTRH O POS 04/02/2025    HEPBSAG Non-Reactive 04/02/2025     Recent Labs   Lab 04/03/25  0448   HGB 12.0   HCT 35.8*       CBC:   Recent Labs   Lab 04/03/25  0448   WBC 26.39*   RBC 4.00*   HGB 12.0   HCT 35.8*      MCV 89.5   MCH 30.0   MCHC 33.5     I have personallly reviewed all pertinent lab results from the last 24 hours.  Recent Lab Results       None            Physical Exam:   Constitutional: She is oriented to person, place, and time. She appears well-developed and well-nourished.        Pulmonary/Chest: Effort normal.        Abdominal:   Incision dry, steri strips intact, no drainage, edema, ecchymosis noted     Genitourinary:    Genitourinary Comments: Uterus: two finger breaths below umbilicus  Lochia: small amount of rubra noted on perineal pad             Musculoskeletal: Moves all extremeties.       Neurological: She is alert and oriented to person, place, and time.    Skin: Skin is warm and dry.    Psychiatric: She has a  normal mood and affect. Her behavior is normal. Judgment and thought content normal.       Review of Systems

## 2025-04-04 NOTE — HPI
4/4/25 Post Op Day 2. Breast pumping. States baby is in the NICU and she plans to go see baby this morning. She states breast pumping is going well.

## 2025-04-04 NOTE — LACTATION NOTE
This note was copied from a baby's chart.  Breastfeeding rounds done, mom pumping, reports getting some colostrum when pumping, mom to contact WIC for home use pumpif discharged without infant.

## 2025-04-05 ENCOUNTER — PATIENT MESSAGE (OUTPATIENT)
Dept: OBSTETRICS AND GYNECOLOGY | Facility: HOSPITAL | Age: 20
End: 2025-04-05
Payer: MEDICAID

## 2025-04-05 VITALS
DIASTOLIC BLOOD PRESSURE: 67 MMHG | RESPIRATION RATE: 16 BRPM | OXYGEN SATURATION: 99 % | WEIGHT: 165.13 LBS | TEMPERATURE: 98 F | HEART RATE: 52 BPM | SYSTOLIC BLOOD PRESSURE: 105 MMHG | BODY MASS INDEX: 32.42 KG/M2 | HEIGHT: 60 IN

## 2025-04-05 PROCEDURE — S4991 NICOTINE PATCH NONLEGEND: HCPCS | Performed by: OBSTETRICS & GYNECOLOGY

## 2025-04-05 PROCEDURE — 25000003 PHARM REV CODE 250: Performed by: OBSTETRICS & GYNECOLOGY

## 2025-04-05 RX ORDER — NICOTINE 7MG/24HR
1 PATCH, TRANSDERMAL 24 HOURS TRANSDERMAL DAILY
Qty: 30 PATCH | Refills: 1 | Status: SHIPPED | OUTPATIENT
Start: 2025-04-05

## 2025-04-05 RX ORDER — HYDROCODONE BITARTRATE AND ACETAMINOPHEN 5; 325 MG/1; MG/1
1 TABLET ORAL EVERY 6 HOURS PRN
Qty: 20 TABLET | Refills: 0 | Status: SHIPPED | OUTPATIENT
Start: 2025-04-05

## 2025-04-05 RX ORDER — VARENICLINE TARTRATE 0.5 (11)-1
KIT ORAL
Qty: 1 EACH | Refills: 0 | Status: SHIPPED | OUTPATIENT
Start: 2025-04-05

## 2025-04-05 RX ORDER — IBUPROFEN 800 MG/1
800 TABLET ORAL EVERY 8 HOURS PRN
Qty: 60 TABLET | Refills: 1 | Status: SHIPPED | OUTPATIENT
Start: 2025-04-05

## 2025-04-05 RX ADMIN — OXYCODONE AND ACETAMINOPHEN 1 TABLET: 10; 325 TABLET ORAL at 05:04

## 2025-04-05 RX ADMIN — DOCUSATE SODIUM 200 MG: 100 CAPSULE, LIQUID FILLED ORAL at 08:04

## 2025-04-05 RX ADMIN — Medication 1 TABLET: at 08:04

## 2025-04-05 RX ADMIN — NICOTINE 1 PATCH: 14 PATCH, EXTENDED RELEASE TRANSDERMAL at 08:04

## 2025-04-05 RX ADMIN — IBUPROFEN 800 MG: 800 TABLET, FILM COATED ORAL at 05:04

## 2025-04-05 RX ADMIN — MUPIROCIN: 20 OINTMENT TOPICAL at 08:04

## 2025-04-05 NOTE — DISCHARGE INSTRUCTIONS
Topic Page  Nurse Date Time Comments   PP Education Booklet Given ---- ke Booklet will be discussed and given to patient prior to discharge 4/5/2025       Skin to skin 18 ke     Rooming in 29 ke     Importance of Exclusive Breastfeeding for 6 mo 35 ke     Bleeding 6 ke     Incision Care 10 ke     Sex 11 ke     Pain Management 8 ke     Perineal Care 9 ke     Minimizing Engorgement 40 ke     Collection & Storage of BM 42-43 ke     S/S of BF Problems  ke     Hand Expression 42 ke     Maternal s/s breast problems 41 ke     Mgnt of Common BF Problems (engorgement, sore & cracked nipples) 40-41 ke     PPD/Anxiety 14-15 ke

## 2025-04-05 NOTE — DISCHARGE SUMMARY
[facility] -  Labor and Delivery  Obstetrics  Discharge Summary      Patient Name: Arnaud Henning  MRN: [id number]  Admission Date: 2025  Hospital Length of Stay: 3 days  Discharge Date and Time: 2025 10:30 AM  Attending Physician: Caleb Toney MD   Discharging Provider: Praveena Lowry CNM   Primary Care Provider: Merary, Primary Doctor    HPI: 25 Post Op Day 2. Breast pumping. States baby is in the NICU and she plans to go see baby this morning. She states breast pumping is going well.         Procedure(s) (LRB):   SECTION (N/A)     Hospital Course:   Patient is a 39 week 1 day primigravida that presented from the clinic with SROM since 0800 am on 2025 and was 1 cm dilated. She experienced non reassuring FHTs and had a primary low transverse  section secondary to fetal intolerance to labor. She delivered a male  with Apgars 7 and 9 weighing 5 pounds 2.9 ounces. She is bottle feeding.      Consults (From admission, onward)          Status Ordering Provider     Inpatient consult to Anesthesiology  Once        Provider:  (Not yet assigned)    Acknowledged CALEB TONEY     Inpatient consult to Anesthesiology  Once        Provider:  (Not yet assigned)    Acknowledged PRAVEENA LOWRY            Final Active Diagnoses:    Diagnosis Date Noted POA    PRINCIPAL PROBLEM:  Status post  section [Z98.891] 2025 Not Applicable    Tobacco dependency [F17.200] 2025 Unknown      Problems Resolved During this Admission:        Significant Diagnostic Studies: Labs: CBC   Recent Labs   Lab 25  1116   WBC 18.31*   HGB 10.6*   HCT 32.6*            Feeding Method: breast    Immunizations       Date Immunization Status Dose Route/Site Given by    25 1802 MMR Incomplete 0.5 mL Subcutaneous/     25 180 Tdap Incomplete 0.5 mL Intramuscular/             Delivery:    Episiotomy:     Lacerations:     Repair suture:     Repair # of packets:      Blood loss (ml):       Birth information:  YOB: 2026   Time of birth: 4:42 PM   Sex: male   Delivery type: , Low Transverse   Gestational Age: 39w1d     Measurements    Weight: 2350 g  Weight (lbs): 5 lb 2.9 oz  Length:          Delivery Clinician: Delivery Providers    Delivering clinician: Caleb Toney MD   Provider Role    Mavis Head CNM              Additional  information:  Forceps:    Vacuum:    Breech:    Observed anomalies      Living?:     Apgars    Living status: Living  Apgar Component Scores:  1 min.:  5 min.:  10 min.:  15 min.:  20 min.:    Skin color:  0  1       Heart rate:  2  2       Reflex irritability:  2  2       Muscle tone:  2  2       Respiratory effort:  1  2       Total:  7  9       Apgars assigned by: STEWART DALAL         Placenta: Delivered:       appearance  Pending Diagnostic Studies:       Procedure Component Value Units Date/Time    Rubella antibody, IgG [[id number]] Collected: 25 145    Order Status: Sent Lab Status: In process Updated: 25    Specimen: Blood     Urinalysis, Reflex to Urine Culture [[id number]]     Order Status: Sent Lab Status: No result     Specimen: Urine             Discharged Condition: good    Disposition: Home or Self Care    Follow Up:   Follow-up Information       Mavis Head CNM .    Specialty: Obstetrics and Gynecology  Contact information:  [address] [place] [id number]  [phone]                           Patient Instructions:      Ambulatory referral/consult to Smoking Cessation Program   Standing Status: Future   Referral Priority: Routine Referral Type: Consultation   Referral Reason: Specialty Services Required   Requested Specialty: CTTS   Number of Visits Requested: 1     Diet Adult Regular     Lifting restrictions   Order Comments: Don't lift anything heavier than your baby     No driving until:   Order Comments: Until seen by provider     Pelvic Rest   Order Comments: No sex,  nothing in vagina until after 7-8 weeks post operation     No dressing needed   Order Comments: Keep incision clean and dry     Notify your health care provider if you experience any of the following:  temperature >100.4     Notify your health care provider if you experience any of the following:  persistent nausea and vomiting or diarrhea     Notify your health care provider if you experience any of the following:  severe uncontrolled pain     Notify your health care provider if you experience any of the following:  redness, tenderness, or signs of infection (pain, swelling, redness, odor or green/yellow discharge around incision site)     Notify your health care provider if you experience any of the following:  difficulty breathing or increased cough     Notify your health care provider if you experience any of the following:  severe persistent headache     Notify your health care provider if you experience any of the following:  worsening rash     Notify your health care provider if you experience any of the following:  persistent dizziness, light-headedness, or visual disturbances     Notify your health care provider if you experience any of the following:  increased confusion or weakness     Notify your health care provider if you experience any of the following:     Activity as tolerated     Medications:  Current Discharge Medication List        START taking these medications    Details   HYDROcodone-acetaminophen (NORCO) 5-325 mg per tablet Take 1 tablet by mouth every 6 (six) hours as needed for Pain.  Qty: 20 tablet, Refills: 0    Comments: Quantity prescribed more than 7 day supply? No  Associated Diagnoses: Status post  delivery      ibuprofen (ADVIL,MOTRIN) 800 MG tablet Take 1 tablet (800 mg total) by mouth every 8 (eight) hours as needed for Pain.  Qty: 60 tablet, Refills: 1      nicotine (NICODERM CQ) 7 mg/24 hr Place 1 patch onto the skin once daily.  Qty: 30 patch, Refills: 1       varenicline tartrate (CHANTIX STARTING MONTH BOX) 0.5 mg (11)- 1 mg (42) tablet Take one 0.5mg tab by mouth once daily X3 days,then increase to one 0.5mg tab twice daily X4 days,then increase to one 1mg tab twice daily  Qty: 1 each, Refills: 0           CONTINUE these medications which have NOT CHANGED    Details   ferrous sulfate 325 (65 FE) MG EC tablet Take 1 tablet (325 mg total) by mouth 2 (two) times daily.  Qty: 60 tablet, Refills: 4    Associated Diagnoses: Anemia, unspecified type      prenatal 21-iron fu-folic acid (PRENATAL COMPLETE) 14 mg iron- 400 mcg Tab Take 1 tablet by mouth once daily.  Qty: 30 tablet, Refills: 11    Associated Diagnoses: 18 weeks gestation of pregnancy           STOP taking these medications       aspirin (ECOTRIN) 81 MG EC tablet Comments:   Reason for Stopping:         famotidine (PEPCID) 20 MG tablet Comments:   Reason for Stopping:         ondansetron (ZOFRAN) 4 MG tablet Comments:   Reason for Stopping:         ondansetron (ZOFRAN-ODT) 8 MG TbDL Comments:   Reason for Stopping:         penicillin v potassium (VEETID) 500 MG tablet Comments:   Reason for Stopping:         terconazole (TERAZOL 7) 0.4 % Crea Comments:   Reason for Stopping:         triamcinolone acetonide 0.025% (KENALOG) 0.025 % cream Comments:   Reason for Stopping:         triamcinolone acetonide 0.1% (KENALOG) 0.1 % cream Comments:   Reason for Stopping:               Mavis Head CNM  Obstetrics  [facility] -  Labor and Delivery

## 2025-04-07 LAB — RUBV IGG SER-ACNC: 25.5 IU/ML

## 2025-04-10 NOTE — INTERVAL H&P NOTE
The patient has been examined and the H&P has been reviewed:    I concur with the findings and no changes have occurred since H&P was written.    Anesthesia/Surgery risks, benefits and alternative options discussed and understood by patient/family.          Active Hospital Problems    Diagnosis  POA    *Status post  section [Z98.891]  Not Applicable    Tobacco dependency [F17.200]  Unknown      Resolved Hospital Problems   No resolved problems to display.

## 2025-04-15 ENCOUNTER — OFFICE VISIT (OUTPATIENT)
Dept: OBSTETRICS AND GYNECOLOGY | Facility: CLINIC | Age: 20
End: 2025-04-15
Payer: COMMERCIAL

## 2025-04-15 VITALS
HEART RATE: 65 BPM | TEMPERATURE: 98 F | DIASTOLIC BLOOD PRESSURE: 66 MMHG | RESPIRATION RATE: 16 BRPM | BODY MASS INDEX: 29.84 KG/M2 | OXYGEN SATURATION: 98 % | HEIGHT: 60 IN | SYSTOLIC BLOOD PRESSURE: 132 MMHG | WEIGHT: 152 LBS

## 2025-04-15 PROCEDURE — 3008F BODY MASS INDEX DOCD: CPT | Mod: CPTII,,, | Performed by: ADVANCED PRACTICE MIDWIFE

## 2025-04-15 PROCEDURE — 3078F DIAST BP <80 MM HG: CPT | Mod: CPTII,,, | Performed by: ADVANCED PRACTICE MIDWIFE

## 2025-04-15 PROCEDURE — 99213 OFFICE O/P EST LOW 20 MIN: CPT | Mod: ,,, | Performed by: ADVANCED PRACTICE MIDWIFE

## 2025-04-15 PROCEDURE — 1159F MED LIST DOCD IN RCRD: CPT | Mod: CPTII,,, | Performed by: ADVANCED PRACTICE MIDWIFE

## 2025-04-15 PROCEDURE — 1111F DSCHRG MED/CURRENT MED MERGE: CPT | Mod: CPTII,,, | Performed by: ADVANCED PRACTICE MIDWIFE

## 2025-04-15 PROCEDURE — 3075F SYST BP GE 130 - 139MM HG: CPT | Mod: CPTII,,, | Performed by: ADVANCED PRACTICE MIDWIFE

## 2025-04-15 RX ORDER — ERGOCALCIFEROL 1.25 MG/1
50000 CAPSULE ORAL
Qty: 5 CAPSULE | Refills: 3 | Status: SHIPPED | OUTPATIENT
Start: 2025-04-15 | End: 2025-05-14

## 2025-04-15 NOTE — PROGRESS NOTES
"CC: Post-partum follow-up    Arnaud Henning is a 19 y.o. female  who presents for post-partum visit.  She is S/P a , due to fetal intolerance to labor .  She and the baby are  doing well.  No pain.  No fever.   No bowel / bladder complaints.    Delivery Date: 2025  Delivery MD: Dr. Nair  Gender: male  Birth Weight: 5 pounds 3 ounces  Breast Feeding: YES  Depression: YES- denies any suicidal or homicidal ideations, inability to care for self or bay, hallucinations, illusions, or delusion. She states she sleeps 4 hrs tahira 24 hr period and sleeps when baby is sleeping. She indicates she has episodes of crying and focus on sadness when she sits down and have time to think about "things". She states her appetite "ok. I just started back eating". She states she has little energy and denies any energy to "do anything to my hair". She denies any inability to concentrate or focus on tasks or information. She refuses counseling/therapy or medications at this time.   Contraception: Depo-Provera    Pregnancy was complicated by:  GBS positive    /66   Pulse 65   Temp 97.6 °F (36.4 °C) (Oral)   Resp 16   Ht 5' (1.524 m)   Wt 68.9 kg (152 lb)   LMP 2024 (Exact Date)   SpO2 98%   Breastfeeding Yes   BMI 29.69 kg/m²     ROS:  GENERAL: No fever, chills, fatigability.  VULVAR: No pain, no lesions and no itching.  VAGINAL: No relaxation, no itching, no discharge, no abnormal bleeding and no lesions.  ABDOMEN: No abdominal pain. Denies nausea. Denies vomiting. No diarrhea. No constipation  BREAST: Denies pain. No lumps. No discharge.  URINARY: No incontinence, no nocturia, no frequency and no dysuria.  CARDIOVASCULAR: No chest pain. No shortness of breath. No leg cramps.  NEUROLOGICAL: No headaches. No vision changes.    PHYSICAL EXAM:  ABDOMEN:  Soft, non-tender, non-distended. Incision healed well- steri strips noted  VULVA:  Normal, no lesions  CERVIX:  deferred  UTERUS:  Normal size, " shape, consistency, no mass or tenderness.  ADNEXA:  deferred    IMP:  2 Weeks Postpartum  Status Post , due to fetal intolerance to labor  Encounter for postpartum visit    Postpartum depression    Other orders  -     ergocalciferol (VITAMIN D2) 50,000 unit Cap; Take 1 capsule (50,000 Units total) by mouth every 7 days. for 5 doses  Dispense: 5 capsule; Refill: 3        ICD-10-CM ICD-9-CM    1. Encounter for postpartum visit  Z39.2 V24.2       2. Postpartum depression  F53.0 648.44      311           PLAN:  May resume normal activities after 8 weeks  May return to school/work after 8 weeks postpartum   Counseling and therapy recommended for possible depression  Steri strips removed  Increase sun exposure  Vitamin 5000 nits weekly  Prenatal vitamins daily with breast feeding  Ibuprofen prn pain  Increase ambulation  Healthy food choices and increase calorie intake to include additional 300 calories daily with breast feeding  Birth control options and counseling discussed  Verbalized understanding to all information and instructions  Questions answered to desired level of satisfaction    Follow up in about 2 weeks (around 2025), or if symptoms worsen or fail to improve, for postpartum visit/exam.

## 2025-04-23 ENCOUNTER — PATIENT MESSAGE (OUTPATIENT)
Dept: OBSTETRICS AND GYNECOLOGY | Facility: CLINIC | Age: 20
End: 2025-04-23
Payer: MEDICAID

## 2025-05-15 ENCOUNTER — OFFICE VISIT (OUTPATIENT)
Dept: OBSTETRICS AND GYNECOLOGY | Facility: CLINIC | Age: 20
End: 2025-05-15
Payer: MEDICAID

## 2025-05-15 VITALS
BODY MASS INDEX: 29.22 KG/M2 | RESPIRATION RATE: 16 BRPM | WEIGHT: 148.81 LBS | SYSTOLIC BLOOD PRESSURE: 128 MMHG | HEIGHT: 60 IN | TEMPERATURE: 98 F | DIASTOLIC BLOOD PRESSURE: 84 MMHG | HEART RATE: 75 BPM | OXYGEN SATURATION: 99 %

## 2025-05-15 DIAGNOSIS — Z30.9 ENCOUNTER FOR CONTRACEPTIVE MANAGEMENT, UNSPECIFIED TYPE: ICD-10-CM

## 2025-05-15 LAB
B-HCG UR QL: NEGATIVE
CTP QC/QA: YES

## 2025-05-15 RX ORDER — SERTRALINE HYDROCHLORIDE 25 MG/1
25 TABLET, FILM COATED ORAL DAILY
Qty: 30 TABLET | Refills: 11 | Status: SHIPPED | OUTPATIENT
Start: 2025-05-15 | End: 2026-05-15

## 2025-05-15 RX ORDER — MEDROXYPROGESTERONE ACETATE 150 MG/ML
150 INJECTION, SUSPENSION INTRAMUSCULAR
Status: DISCONTINUED | OUTPATIENT
Start: 2025-05-15 | End: 2025-05-15

## 2025-05-15 RX ORDER — SERTRALINE HYDROCHLORIDE 50 MG/1
25 TABLET, FILM COATED ORAL DAILY
Qty: 30 TABLET | Refills: 11 | Status: SHIPPED | OUTPATIENT
Start: 2025-05-15 | End: 2025-05-15

## 2025-05-15 RX ORDER — MEDROXYPROGESTERONE ACETATE 150 MG/ML
150 INJECTION, SUSPENSION INTRAMUSCULAR
Status: SHIPPED | OUTPATIENT
Start: 2025-05-15 | End: 2026-05-10

## 2025-05-15 RX ADMIN — MEDROXYPROGESTERONE ACETATE 150 MG: 150 INJECTION, SUSPENSION INTRAMUSCULAR at 12:05

## 2025-05-15 NOTE — PROGRESS NOTES
CC: Post-partum follow-up    Arnaud Henning is a 19 y.o. female  who presents for post-partum visit.  She is S/P a , due to fetal intolerance.  She and the baby are both doing well.  No pain.  No fever.   No bowel / bladder complaints.    Delivery Date: 2025  Delivery MD: Caleb Toney  Gender: male  Birth Weight: 5 pounds 2 ounces  Breast Feeding: NO  Depression: YES- states cries and feels depressed sometimes. She requests antidepressants. She denies any hallucinations, delusions, or illusions. States doesn't sleep well at night.   Contraception: Depo-Provera    Pregnancy was complicated by:  None    /84   Pulse 75   Temp 97.6 °F (36.4 °C) (Oral)   Resp 16   Ht 5' (1.524 m)   Wt 67.5 kg (148 lb 12.8 oz)   LMP 2025 (Approximate)   SpO2 99%   Breastfeeding No   BMI 29.06 kg/m²     ROS:  GENERAL: No fever, chills, fatigability.  VULVAR: No pain, no lesions and no itching.  VAGINAL: No relaxation, no itching, no discharge, no abnormal bleeding and no lesions.  ABDOMEN: No abdominal pain. Denies nausea. Denies vomiting. No diarrhea. No constipation  BREAST: Denies pain. No lumps. No discharge.  URINARY: No incontinence, no nocturia, no frequency and no dysuria.  CARDIOVASCULAR: No chest pain. No shortness of breath. No leg cramps.  NEUROLOGICAL: No headaches. No vision changes.    PHYSICAL EXAM:  ABDOMEN:  Soft, non-tender, non-distended  VULVA:  Normal, no lesions  CERVIX:  Without lesions, polyps or tenderness.  UTERUS:  Normal size, shape, consistency, no mass or tenderness.  ADNEXA:  Normal in size without mass or tenderness    IMP:  6 Weeks Postpartum  Status Post , due to fetal intolerance  Postpartum exam    Encounter for contraceptive management, unspecified type  -     POCT urine pregnancy  -     Discontinue: medroxyPROGESTERone (DEPO-PROVERA) injection 150 mg  -     medroxyPROGESTERone (DEPO-PROVERA) injection 150 mg    Postpartum depression  -      Discontinue: sertraline (ZOLOFT) 50 MG tablet; Take 0.5 tablets (25 mg total) by mouth once daily.  Dispense: 30 tablet; Refill: 11    Other orders  -     sertraline (ZOLOFT) 25 MG tablet; Take 1 tablet (25 mg total) by mouth once daily.  Dispense: 30 tablet; Refill: 11        ICD-10-CM ICD-9-CM    1. Postpartum exam  Z39.2 V24.2       2. Encounter for contraceptive management, unspecified type  Z30.9 V25.9 POCT urine pregnancy      medroxyPROGESTERone (DEPO-PROVERA) injection 150 mg      DISCONTINUED: medroxyPROGESTERone (DEPO-PROVERA) injection 150 mg      3. Postpartum depression  F53.0 648.44 DISCONTINUED: sertraline (ZOLOFT) 50 MG tablet     311           PLAN:  May resume normal activities after 8 weeks postpartum/post  section  May be released to return to work 8 weeks postpartum/post  section  Birth control options and counseling discussed  Discussed side effects of depo provera to include irregular menses, weight gain, increase risk of developing headaches, amenorrhea, breast tenderness, breast fullness, and bone loss  Calcium and vitamin D daily  Multivitamin daily  Low weight bearing exercises encouraged  Verbalized understanding to all information and instructions  Questions answered to desired level of satisfaction    Follow up in 6 weeks (on 2025), or if symptoms worsen or fail to improve.

## 2025-08-04 ENCOUNTER — CLINICAL SUPPORT (OUTPATIENT)
Dept: OBSTETRICS AND GYNECOLOGY | Facility: CLINIC | Age: 20
End: 2025-08-04
Payer: MEDICAID

## 2025-08-04 DIAGNOSIS — Z30.9 ENCOUNTER FOR CONTRACEPTIVE MANAGEMENT, UNSPECIFIED TYPE: Primary | ICD-10-CM

## (undated) DEVICE — PACK C SECTION RUSH

## (undated) DEVICE — GLOVE PROTEXIS PI SYN SURG 6.5

## (undated) DEVICE — STRIP MEDI WND CLSR 1/4X4IN

## (undated) DEVICE — SUT VICRYL PLUS 1 CTX 36IN

## (undated) DEVICE — ADHESIVE DERMABOND ADVANCED

## (undated) DEVICE — CANISTER 1200 SUCTION CCMEDI-V

## (undated) DEVICE — CLAMP UMBILICAL CORD OFF-WHITE

## (undated) DEVICE — GLOVE PROTEXIS PI SYN SURG 7

## (undated) DEVICE — BELLOW CANN HEMOBLAST 1.65GR

## (undated) DEVICE — APPLICATOR CHLORAPREP ORN 26ML

## (undated) DEVICE — SUT 0 36IN COATED VICRYL UN

## (undated) DEVICE — CALIBRATOR ALNTY C LIPID MULTI

## (undated) DEVICE — SOL NACL IRR 1000ML BTL

## (undated) DEVICE — STAPLER SKIN SUBCUTICULAR

## (undated) DEVICE — ELECTRODE REM PLYHSV RETURN 9

## (undated) DEVICE — SUT 2/0 27IN PLAIN GUT CT